# Patient Record
Sex: FEMALE | Race: BLACK OR AFRICAN AMERICAN | NOT HISPANIC OR LATINO | Employment: UNEMPLOYED | URBAN - METROPOLITAN AREA
[De-identification: names, ages, dates, MRNs, and addresses within clinical notes are randomized per-mention and may not be internally consistent; named-entity substitution may affect disease eponyms.]

---
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
## 2023-01-03 ENCOUNTER — PATIENT MESSAGE (OUTPATIENT)
Dept: RESEARCH | Facility: HOSPITAL | Age: 18
End: 2023-01-03
Payer: MEDICAID

## 2023-04-20 ENCOUNTER — HOSPITAL ENCOUNTER (EMERGENCY)
Facility: HOSPITAL | Age: 18
Discharge: HOME | End: 2023-04-20
Attending: INTERNAL MEDICINE
Payer: MEDICAID

## 2023-04-20 VITALS
SYSTOLIC BLOOD PRESSURE: 128 MMHG | RESPIRATION RATE: 18 BRPM | DIASTOLIC BLOOD PRESSURE: 74 MMHG | BODY MASS INDEX: 27.11 KG/M2 | HEART RATE: 72 BPM | OXYGEN SATURATION: 98 % | TEMPERATURE: 98 F | HEIGHT: 63 IN | WEIGHT: 153 LBS

## 2023-04-20 DIAGNOSIS — R10.9 ABDOMINAL PAIN: ICD-10-CM

## 2023-04-20 DIAGNOSIS — K59.00 CONSTIPATION, UNSPECIFIED CONSTIPATION TYPE: Primary | ICD-10-CM

## 2023-04-20 LAB
ALBUMIN SERPL-MCNC: 4 G/DL (ref 3.3–5.5)
ALP SERPL-CCNC: 87 U/L (ref 42–141)
B-HCG UR QL: NEGATIVE
BILIRUB SERPL-MCNC: 0.7 MG/DL (ref 0.2–1.6)
BILIRUBIN, POC UA: NEGATIVE
BLOOD, POC UA: NEGATIVE
BUN SERPL-MCNC: 10 MG/DL (ref 7–22)
CALCIUM SERPL-MCNC: 10.2 MG/DL (ref 8–10.3)
CHLORIDE SERPL-SCNC: 108 MMOL/L (ref 98–108)
CLARITY, POC UA: CLEAR
COLOR, POC UA: YELLOW
CREAT SERPL-MCNC: 1 MG/DL (ref 0.6–1.2)
CTP QC/QA: YES
GLUCOSE SERPL-MCNC: 89 MG/DL (ref 73–118)
GLUCOSE, POC UA: NEGATIVE
KETONES, POC UA: NEGATIVE
LEUKOCYTE EST, POC UA: NEGATIVE
NITRITE, POC UA: NEGATIVE
PH UR STRIP: 7 [PH]
POC ALT (SGPT): 13 U/L (ref 10–47)
POC AST (SGOT): 22 U/L (ref 11–38)
POC TCO2: 28 MMOL/L (ref 18–33)
POTASSIUM BLD-SCNC: 3.7 MMOL/L (ref 3.6–5.1)
PROTEIN, POC UA: NEGATIVE
PROTEIN, POC: 7.3 G/DL (ref 6.4–8.1)
SODIUM BLD-SCNC: 143 MMOL/L (ref 128–145)
SPECIFIC GRAVITY, POC UA: 1.02
UROBILINOGEN, POC UA: 0.2 E.U./DL

## 2023-04-20 PROCEDURE — 99283 EMERGENCY DEPT VISIT LOW MDM: CPT | Mod: ER

## 2023-04-20 PROCEDURE — 81003 URINALYSIS AUTO W/O SCOPE: CPT | Mod: ER

## 2023-04-20 PROCEDURE — 81025 URINE PREGNANCY TEST: CPT | Mod: ER | Performed by: INTERNAL MEDICINE

## 2023-04-20 PROCEDURE — 85025 COMPLETE CBC W/AUTO DIFF WBC: CPT | Mod: ER

## 2023-04-20 PROCEDURE — 80053 COMPREHEN METABOLIC PANEL: CPT | Mod: ER

## 2023-04-20 RX ORDER — SYRING-NEEDL,DISP,INSUL,0.3 ML 29 G X1/2"
296 SYRINGE, EMPTY DISPOSABLE MISCELLANEOUS ONCE
Qty: 1 EACH | Refills: 0 | Status: SHIPPED | OUTPATIENT
Start: 2023-04-20 | End: 2023-04-20

## 2023-04-20 NOTE — ED PROVIDER NOTES
Encounter Date: 4/20/2023       History     Chief Complaint   Patient presents with    Abdominal Pain     Pt presents to ER with c/o periumbilical pain that started earlier yesterday.  Denies any nausea, vomiting, diarrhea or fever.  Boyfriend reports that she told him she has not had a BM in 3 days.       18-year-old female presents to the emergency department complaining abdominal pain since yesterday morning.  She states she is not had a bowel movement in 2-3 days and has had a total of 1 bottle water over the past 24 hours.  She denies fever/chills/nausea/vomiting/chest pain/shortness of breath.    The history is provided by the patient. No  was used.   Review of patient's allergies indicates:  No Known Allergies  Past Medical History:   Diagnosis Date    Anemia      Past Surgical History:   Procedure Laterality Date    TONSILLECTOMY       Family History   Problem Relation Age of Onset    Hypertension Mother     Hypertension Paternal Grandmother     Diabetes Maternal Grandmother     Hypertension Maternal Grandmother     Hypertension Maternal Grandfather      Social History     Tobacco Use    Smoking status: Every Day    Smokeless tobacco: Never   Substance Use Topics    Alcohol use: No    Drug use: Yes     Types: Marijuana     Review of Systems   Constitutional:  Negative for chills and fever.   Respiratory:  Negative for shortness of breath.    Cardiovascular:  Negative for chest pain.   Gastrointestinal:  Positive for abdominal pain and constipation. Negative for anal bleeding, blood in stool, diarrhea, nausea and vomiting.   Musculoskeletal:  Negative for back pain.   All other systems reviewed and are negative.    Physical Exam     Initial Vitals [04/20/23 0238]   BP Pulse Resp Temp SpO2   125/60 (!) 56 18 98.4 °F (36.9 °C) 100 %      MAP       --         Physical Exam    Nursing note and vitals reviewed.  Constitutional: She is not diaphoretic. No distress.   HENT:   Head: Normocephalic  and atraumatic.   Right Ear: External ear normal.   Left Ear: External ear normal.   Eyes: Conjunctivae are normal.   Neck:   Normal range of motion.  Cardiovascular:  Normal rate.           Pulmonary/Chest: Breath sounds normal. No respiratory distress.   Abdominal: Abdomen is soft. Bowel sounds are normal.   No abdominal tenderness to palpation   Musculoskeletal:         General: Normal range of motion.      Cervical back: Normal range of motion.     Neurological: She is alert. She has normal strength.   Skin: Skin is warm and dry. Capillary refill takes less than 2 seconds.   Psychiatric: She has a normal mood and affect. Thought content normal.       ED Course   Procedures  Labs Reviewed   POCT CBC   POCT URINE PREGNANCY   POCT URINALYSIS W/O SCOPE   POCT URINALYSIS W/O SCOPE   POCT CMP   POCT CMP            Imaging Results              X-Ray Abdomen Flat And Erect (Final result)  Result time 04/20/23 03:15:31      Final result by Jarad Oconnell MD (04/20/23 03:15:31)                   Impression:      Nonobstructed bowel-gas pattern.      Electronically signed by: Jarad Oconnell MD  Date:    04/20/2023  Time:    03:15               Narrative:    EXAMINATION:  XR ABDOMEN FLAT AND ERECT    CLINICAL HISTORY:  Unspecified abdominal pain    TECHNIQUE:  Flat and erect AP views of the abdomen were preformed.    COMPARISON:  03/27/2015.    FINDINGS:  There are no calcifications overlying the kidneys.  Bowel gas pattern is non-obstructive.  No evidence for pneumoperitoneum.  Regional osseous structures are similar to prior.                                       Medications - No data to display  Medical Decision Making:   Initial Assessment:   18-year-old female presents to the emergency department complaining abdominal pain since yesterday morning.  She states she is not had a bowel movement in 2-3 days and has had a total of 1 bottle water over the past 24 hours.  She denies fever/chills/nausea/vomiting/chest  pain/shortness of breath.  ED Management:  Urinalysis showed no signs of infection and UPT was negative.  CBC and CMP were ordered as well as abdominal x-ray.  CBC and CMP are reassuring and abdominal x-ray shows no acute findings.  Patient was given instructions for constipation and advised to follow-up with her primary care physician within the next week for re-evaluation/return to the emergency department if condition worsens.  She was encouraged to increase oral water intake and a prescription for magnesium citrate was given prior to discharge.                        Clinical Impression:   Final diagnoses:  [R10.9] Abdominal pain  [K59.00] Constipation, unspecified constipation type (Primary)        ED Disposition Condition    Discharge Stable          ED Prescriptions       Medication Sig Dispense Start Date End Date Auth. Provider    magnesium citrate solution (Expires today) Take 296 mLs by mouth once. for 1 dose 1 each 4/20/2023 4/20/2023 Lavell Greenfield MD          Follow-up Information       Follow up With Specialties Details Why Contact Info    Miracle Kaur MD Pediatrics Schedule an appointment as soon as possible for a visit in 1 week For reevaluation [address] [place] [id number]  [phone]               Lavell Greenfield MD  04/20/23 0326

## 2023-05-12 ENCOUNTER — PATIENT MESSAGE (OUTPATIENT)
Dept: RESEARCH | Facility: HOSPITAL | Age: 18
End: 2023-05-12
Payer: MEDICAID

## 2023-08-01 ENCOUNTER — PATIENT MESSAGE (OUTPATIENT)
Dept: RESEARCH | Facility: HOSPITAL | Age: 18
End: 2023-08-01
Payer: MEDICAID

## 2023-09-21 ENCOUNTER — HOSPITAL ENCOUNTER (EMERGENCY)
Facility: HOSPITAL | Age: 18
Discharge: HOME | End: 2023-09-22
Attending: EMERGENCY MEDICINE
Payer: MEDICAID

## 2023-09-21 DIAGNOSIS — R53.83 FATIGUE, UNSPECIFIED TYPE: Primary | ICD-10-CM

## 2023-09-21 DIAGNOSIS — R11.2 NAUSEA AND VOMITING, UNSPECIFIED VOMITING TYPE: ICD-10-CM

## 2023-09-21 DIAGNOSIS — R06.02 SHORTNESS OF BREATH: ICD-10-CM

## 2023-09-21 LAB
ALBUMIN SERPL-MCNC: 3.9 G/DL (ref 3.3–5.5)
ALP SERPL-CCNC: 96 U/L (ref 42–141)
B-HCG UR QL: NEGATIVE
BILIRUB SERPL-MCNC: 0.5 MG/DL (ref 0.2–1.6)
BILIRUBIN, POC UA: NEGATIVE
BLOOD, POC UA: NEGATIVE
BUN SERPL-MCNC: 12 MG/DL (ref 7–22)
CALCIUM SERPL-MCNC: 9.7 MG/DL (ref 8–10.3)
CHLORIDE SERPL-SCNC: 103 MMOL/L (ref 98–108)
CLARITY, POC UA: CLEAR
COLOR, POC UA: YELLOW
CREAT SERPL-MCNC: 0.8 MG/DL (ref 0.6–1.2)
CTP QC/QA: YES
GLUCOSE SERPL-MCNC: 111 MG/DL (ref 73–118)
GLUCOSE, POC UA: NEGATIVE
HCT, POC: NORMAL
HGB, POC: NORMAL (ref 14–18)
KETONES, POC UA: NEGATIVE
LEUKOCYTE EST, POC UA: NEGATIVE
MCH, POC: NORMAL
MCHC, POC: NORMAL
MCV, POC: NORMAL
MPV, POC: NORMAL
NITRITE, POC UA: NEGATIVE
PH UR STRIP: 7 [PH]
POC ALT (SGPT): 12 U/L (ref 10–47)
POC AST (SGOT): 22 U/L (ref 11–38)
POC PLATELET COUNT: NORMAL
POC TCO2: 30 MMOL/L (ref 18–33)
POTASSIUM BLD-SCNC: 3.7 MMOL/L (ref 3.6–5.1)
PROTEIN, POC UA: NEGATIVE
PROTEIN, POC: 6.9 G/DL (ref 6.4–8.1)
RBC, POC: NORMAL
RDW, POC: NORMAL
SODIUM BLD-SCNC: 139 MMOL/L (ref 128–145)
SPECIFIC GRAVITY, POC UA: >=1.03
UROBILINOGEN, POC UA: 0.2 E.U./DL
WBC, POC: NORMAL

## 2023-09-21 PROCEDURE — 93010 ELECTROCARDIOGRAM REPORT: CPT | Mod: ,,, | Performed by: INTERNAL MEDICINE

## 2023-09-21 PROCEDURE — 81025 URINE PREGNANCY TEST: CPT | Mod: ER | Performed by: EMERGENCY MEDICINE

## 2023-09-21 PROCEDURE — 93010 EKG 12-LEAD: ICD-10-PCS | Mod: ,,, | Performed by: INTERNAL MEDICINE

## 2023-09-21 PROCEDURE — 93005 ELECTROCARDIOGRAM TRACING: CPT | Mod: ER

## 2023-09-21 PROCEDURE — 99284 EMERGENCY DEPT VISIT MOD MDM: CPT | Mod: ER

## 2023-09-22 VITALS
BODY MASS INDEX: 27.82 KG/M2 | DIASTOLIC BLOOD PRESSURE: 68 MMHG | HEIGHT: 63 IN | OXYGEN SATURATION: 100 % | HEART RATE: 54 BPM | TEMPERATURE: 98 F | SYSTOLIC BLOOD PRESSURE: 116 MMHG | RESPIRATION RATE: 16 BRPM | WEIGHT: 157 LBS

## 2023-09-22 RX ORDER — ONDANSETRON 8 MG/1
8 TABLET, ORALLY DISINTEGRATING ORAL EVERY 6 HOURS PRN
Qty: 12 TABLET | Refills: 0 | Status: SHIPPED | OUTPATIENT
Start: 2023-09-22

## 2023-09-22 RX ORDER — POLYETHYLENE GLYCOL 3350 17 G/17G
17 POWDER, FOR SOLUTION ORAL DAILY
Qty: 119 G | Refills: 0 | Status: SHIPPED | OUTPATIENT
Start: 2023-09-22

## 2023-09-22 RX ORDER — LACTULOSE 10 G/15ML
30 SOLUTION ORAL 3 TIMES DAILY
Qty: 405 ML | Refills: 0 | Status: SHIPPED | OUTPATIENT
Start: 2023-09-22 | End: 2023-09-25

## 2023-09-22 NOTE — ED PROVIDER NOTES
"Encounter Date: 9/21/2023    SCRIBE #1 NOTE: I, Judd Wright, am scribing for, and in the presence of,  Raquel Carrillo MD. I have scribed the following portions of the note - Other sections scribed: HPI, ROS, PE.       History     Chief Complaint   Patient presents with    General Illness     Pt reports 1 week of migraines, nausea, SOB, and loss of appetite. Reports irregular menstrual cycles, states she had a negative pregnancy test two days ago. Pt can speak in complete sentences without signs of respiratory distress at this time.      18 year old female with no known medical problems presenting to the Emergency room for further evaluation of nausea, decreased appetite, headaches, and intermittent vomiting that began 1 week ago. Patient reports 3 episodes of non-bloody, non-coffee ground appearing emesis over the last week. She endorses intermittent generalized abdominal discomfort that she describes as a "nauseated" feeling sometimes after eating. Last meal 2 hours PTA. LBM was normal. Denies bright red bleeding from rectum or melena. She notes chronic fatigue, chronic irregular menstrual cycle, and chronic difficulty sleeping. She mentions having a normal sleep study over the summer. She also mentions having nasal congestion 2 nights ago, which resolved after treatment of Nyquil. Denies other treatment for symptoms. Patient rates headache 1/10. Abdominal discomfort and nausea currently resolved. LMP 1 month ago. Denies contraceptive use. No other exacerbating or alleviating factors. NKDA. Denies urinary symptoms, diarrhea, constipation, cough, shortness of breath, chest pain, leg swelling palpitations, dizziness, syncope, or others. No further complaints at present time.       The history is provided by the patient. No  was used.     Review of patient's allergies indicates:  No Known Allergies  Past Medical History:   Diagnosis Date    Anemia      Past Surgical History:   Procedure " Laterality Date    TONSILLECTOMY       Family History   Problem Relation Age of Onset    Hypertension Mother     Hypertension Paternal Grandmother     Diabetes Maternal Grandmother     Hypertension Maternal Grandmother     Hypertension Maternal Grandfather      Social History     Tobacco Use    Smoking status: Every Day    Smokeless tobacco: Never   Substance Use Topics    Alcohol use: No    Drug use: Yes     Types: Marijuana     Review of Systems   Constitutional:  Positive for appetite change. Negative for fever.   HENT:  Positive for congestion (Resolved).    Respiratory:  Negative for cough and shortness of breath.    Cardiovascular:  Negative for chest pain and palpitations.   Gastrointestinal:  Positive for nausea (Resolved) and vomiting. Negative for anal bleeding, blood in stool, constipation and diarrhea.        + Abdominal discomfort (Presently resolved)     Genitourinary:  Negative for decreased urine volume, difficulty urinating, dysuria and frequency.   Neurological:  Positive for headaches. Negative for dizziness and syncope.   All other systems reviewed and are negative.      Physical Exam     Initial Vitals [09/21/23 2215]   BP Pulse Resp Temp SpO2   128/76 (!) 57 18 97.9 °F (36.6 °C) 100 %      MAP       --         Physical Exam    Nursing note and vitals reviewed.  Constitutional: She appears well-developed and well-nourished.   HENT:   Head: Normocephalic and atraumatic.   Right Ear: External ear normal.   Left Ear: External ear normal.   Eyes: Conjunctivae are normal.   Neck: Phonation normal. Neck supple.   Normal range of motion.  Cardiovascular:  Regular rhythm and intact distal pulses.   Bradycardia present.         Pulmonary/Chest: Effort normal. No accessory muscle usage or stridor. No tachypnea. No respiratory distress.   Abdominal: Abdomen is soft. There is no abdominal tenderness.   Musculoskeletal:         General: No tenderness or edema.      Cervical back: Normal range of motion and  neck supple.     Neurological: She is alert and oriented to person, place, and time.   Skin: Skin is warm and dry. No rash noted.   Psychiatric: She has a normal mood and affect. Her behavior is normal.         ED Course   Procedures  Labs Reviewed   POCT URINALYSIS W/O SCOPE - Abnormal; Notable for the following components:       Result Value    Spec Grav UA >=1.030 (*)     All other components within normal limits   POCT URINE PREGNANCY   POCT URINALYSIS W/O SCOPE   POCT CBC   POCT CMP   POCT CMP          Imaging Results    None          Medications - No data to display  Medical Decision Making  Amount and/or Complexity of Data Reviewed  External Data Reviewed: labs and ECG.  Labs: ordered.  ECG/medicine tests: ordered.    Risk  OTC drugs.  Prescription drug management.    Labs Reviewed        Admission on 09/21/2023   Component Date Value Ref Range Status    POC Preg Test, Ur 09/21/2023 Negative  Negative Final     Acceptable 09/21/2023 Yes   Final    Glucose, UA 09/21/2023 Negative   Final    Bilirubin, UA 09/21/2023 Negative   Final    Ketones, UA 09/21/2023 Negative   Final    Spec Grav UA 09/21/2023 >=1.030 (>)   Final    Blood, UA 09/21/2023 Negative   Final    PH, UA 09/21/2023 7.0   Final    Protein, UA 09/21/2023 Negative   Final    Urobilinogen, UA 09/21/2023 0.2  E.U./dL Final    Nitrite, UA 09/21/2023 Negative   Final    Leukocytes, UA 09/21/2023 Negative   Final    Color, UA 09/21/2023 Yellow   Final    Clarity, UA 09/21/2023 Clear   Final    Albumin, POC 09/21/2023 3.9  3.3 - 5.5 g/dL Final    Alkaline Phosphatase, POC 09/21/2023 96  42 - 141 U/L Final    ALT (SGPT), POC 09/21/2023 12  10 - 47 U/L Final    AST (SGOT), POC 09/21/2023 22  11 - 38 U/L Final    POC BUN 09/21/2023 12  7 - 22 mg/dL Final    Calcium, POC 09/21/2023 9.7  8.0 - 10.3 mg/dL Final    POC Chloride 09/21/2023 103  98 - 108 mmol/L Final    POC Creatinine 09/21/2023 0.8  0.6 - 1.2 mg/dL Final    POC Glucose 09/21/2023  111  73 - 118 mg/dL Final    POC Potassium 09/21/2023 3.7  3.6 - 5.1 mmol/L Final    POC Sodium 09/21/2023 139  128 - 145 mmol/L Final    Bilirubin, POC 09/21/2023 0.5  0.2 - 1.6 mg/dL Final    POC TCO2 09/21/2023 30  18 - 33 mmol/L Final    Protein, POC 09/21/2023 6.9  6.4 - 8.1 g/dL Final        Imaging Reviewed    Imaging Results    None         Medications given in ED    Medications - No data to display      Note was created using voice recognition software. Note may have occasional typographical errors that may not have been identified and edited despite good jeff initial review prior to signing.    ***          Scribe Attestation:   Scribe #1: I performed the above scribed service and the documentation accurately describes the services I performed. I attest to the accuracy of the note.                        Clinical Impression:   Final diagnoses:  [R06.02] Shortness of breath  [R53.83] Fatigue, unspecified type (Primary)  [R11.2] Nausea and vomiting, unspecified vomiting type        ED Disposition Condition    Discharge Stable          ED Prescriptions       Medication Sig Dispense Start Date End Date Auth. Provider    ondansetron (ZOFRAN-ODT) 8 MG TbDL Take 1 tablet (8 mg total) by mouth every 6 (six) hours as needed. 12 tablet 9/22/2023 -- Raquel Carrillo MD    lactulose (CHRONULAC) 20 gram/30 mL Soln Take 45 mLs (30 g total) by mouth 3 (three) times daily. (Alternative dosing 45 ml every hour until two large BMs achieved in a single day) for 3 days 405 mL 9/22/2023 9/25/2023 Raquel Carrillo MD    polyethylene glycol (GLYCOLAX) 17 gram/dose powder Take 17 g by mouth once daily. 119 g 9/22/2023 -- Raquel Carrillo MD          Follow-up Information       Follow up With Specialties Details Why Contact Info    Miracle Kaur MD Pediatrics Call today to schedule an appointment, for re-evaluation of today's complaint, and ongoing care [address] [place] [address] [id number] [phone]      The nearest  emergency department.  Go to  As needed, If symptoms worsen     [facility] Cardiology Call in 1 day to schedule an appointment, for re-evaluation of today's complaint, and ongoing care [address] [facility] [id number] [phone]

## 2023-09-22 NOTE — DISCHARGE INSTRUCTIONS
Drink plenty of electrolyte-rich fluids (at least one gallon or more daily).  Limit/avoid caffeine (such as coffee, tea, Coke, Coke Zero, Pepsi, Root Beer, Dr .Pepper, Mountain Dew, energy drinks, pre-workout supplements, and many others.) alcohol and dairy intake while symptoms persist.

## 2023-09-22 NOTE — ED PROVIDER NOTES
"Encounter Date: 9/21/2023    SCRIBE #1 NOTE: I, Judd Wright, am scribing for, and in the presence of,  Raquel Carrillo MD. I have scribed the following portions of the note - Other sections scribed: HPI.       History     Chief Complaint   Patient presents with    General Illness     Pt reports 1 week of migraines, nausea, SOB, and loss of appetite. Reports irregular menstrual cycles, states she had a negative pregnancy test two days ago. Pt can speak in complete sentences without signs of respiratory distress at this time.      18 year old female with no known medical problems presenting to the Emergency room for further evaluation of nausea, decreased appetite, headaches, and intermittent vomiting that began 1 week ago. Patient reports 3 episodes of non-bloody, non-coffee ground appearing emesis over the last week. She endorses intermittent generalized abdominal discomfort that she describes as a "nauseated" feeling sometimes after eating. Last meal 2 hours PTA. LBM was normal. Denies bright red bleeding from rectum or melena. She notes chronic fatigue, chronic irregular menstrual cycle, and chronic difficulty sleeping. She mentions having a normal sleep study over the summer. She also mentions having nasal congestion 2 nights ago, which resolved after treatment of Nyquil. Denies other treatment for symptoms. Patient rates headache 1/10. Abdominal discomfort and nausea currently resolved. LMP 1 month ago. Denies contraceptive use. No other exacerbating or alleviating factors. NKDA. Denies urinary symptoms, diarrhea, constipation, cough, shortness of breath, chest pain, leg swelling palpitations, dizziness, syncope, or others. No further complaints at present time.       The history is provided by the patient.     Review of patient's allergies indicates:  No Known Allergies  Past Medical History:   Diagnosis Date    Anemia      Past Surgical History:   Procedure Laterality Date    TONSILLECTOMY       Family " History   Problem Relation Age of Onset    Hypertension Mother     Hypertension Paternal Grandmother     Diabetes Maternal Grandmother     Hypertension Maternal Grandmother     Hypertension Maternal Grandfather      Social History     Tobacco Use    Smoking status: Every Day    Smokeless tobacco: Never   Substance Use Topics    Alcohol use: No    Drug use: Yes     Types: Marijuana     Review of Systems   Constitutional:  Positive for fatigue. Negative for fever.   Respiratory:  Negative for cough and shortness of breath.    Cardiovascular:  Negative for chest pain, palpitations and leg swelling.   Gastrointestinal:  Positive for abdominal pain, nausea and vomiting. Negative for constipation and diarrhea.   Genitourinary:  Positive for menstrual problem (irregular).   Neurological:  Positive for headaches (mild, intermittent).   Psychiatric/Behavioral:  Positive for sleep disturbance (chronic).        Physical Exam     Initial Vitals [09/21/23 2215]   BP Pulse Resp Temp SpO2   128/76 (!) 57 18 97.9 °F (36.6 °C) 100 %      MAP       --         Physical Exam    Nursing note and vitals reviewed.  Constitutional: She appears well-developed and well-nourished. She is not diaphoretic. No distress.   HENT:   Head: Normocephalic and atraumatic.   Eyes: Conjunctivae are normal. Pupils are equal, round, and reactive to light.   Neck: Neck supple.   Normal range of motion.  Cardiovascular:  Regular rhythm and intact distal pulses.   Bradycardia present.         Pulmonary/Chest: No accessory muscle usage. No tachypnea. No respiratory distress.   Abdominal: Abdomen is soft and protuberant. Bowel sounds are normal. She exhibits no distension. There is no abdominal tenderness. There is no rebound and no guarding.   Musculoskeletal:         General: No tenderness. Normal range of motion.      Cervical back: Normal range of motion and neck supple.     Neurological: She is alert and oriented to person, place, and time. She has normal  strength. Gait normal. GCS eye subscore is 4. GCS verbal subscore is 5. GCS motor subscore is 6.   Skin: Skin is warm. Capillary refill takes less than 2 seconds.   Psychiatric: She has a normal mood and affect.         ED Course   Procedures  Labs Reviewed   POCT URINALYSIS W/O SCOPE - Abnormal; Notable for the following components:       Result Value    Spec Grav UA >=1.030 (*)     All other components within normal limits   POCT URINE PREGNANCY   POCT URINALYSIS W/O SCOPE   POCT CBC   POCT CMP   POCT CMP     EKG Readings: (Independently Interpreted)   Initial Reading: No STEMI. Rhythm: Sinus Bradycardia. Heart Rate: 50. Ectopy: No Ectopy. Conduction: Normal. ST Segments: Normal ST Segments. T Waves Flipped: III. Clinical Impression: Normal Sinus Rhythm     ECG Results              EKG 12-lead (Final result)  Result time 09/23/23 18:43:56      Final result by Interface, Lab In [facility] (09/23/23 18:43:56)                   Narrative:    Test Reason : R06.02,    Vent. Rate : 050 BPM     Atrial Rate : 050 BPM     P-R Int : 120 ms          QRS Dur : 074 ms      QT Int : 436 ms       P-R-T Axes : 016 033 020 degrees     QTc Int : 397 ms    Sinus bradycardia with sinus arrhythmia  Cannot rule out Anterior infarct ,age undetermined  Abnormal ECG  When compared with ECG of 23-AUG-2022 03:06,  Questionable change in The axis  Nonspecific T wave abnormality has replaced inverted T waves in Lateral  leads  Confirmed by Nelson Quach MD ([id number]) on 9/23/2023 6:43:46 PM    Referred By: [email or web address]   SELF           Confirmed By:Nelson Quach MD                                  Imaging Results    None          Medications - No data to display  Medical Decision Making  Amount and/or Complexity of Data Reviewed  Labs: ordered.    Risk  OTC drugs.  Prescription drug management.                          Medical Decision Making:   Differential Diagnosis:   gastroenteritis (viral vs bacterial vs parasitic vs toxin exposure),  diverticulitis, dehydration, electrolyte abnormality, toxicologic exposure, thyroid storm, GIB, hypovolemia, DKA, pancreatitis, IBS, metabolic disturbance, antibiotic/drug induced, and others.     Clinical Tests:   Lab Tests: Ordered and Reviewed  Labs Reviewed      Admission on 09/21/2023, Discharged on 09/22/2023   Component Date Value Ref Range Status    POC Preg Test, Ur 09/21/2023 Negative  Negative Final     Acceptable 09/21/2023 Yes   Final    Glucose, UA 09/21/2023 Negative   Final    Bilirubin, UA 09/21/2023 Negative   Final    Ketones, UA 09/21/2023 Negative   Final    Spec Grav UA 09/21/2023 >=1.030 (>)   Final    Blood, UA 09/21/2023 Negative   Final    PH, UA 09/21/2023 7.0   Final    Protein, UA 09/21/2023 Negative   Final    Urobilinogen, UA 09/21/2023 0.2  E.U./dL Final    Nitrite, UA 09/21/2023 Negative   Final    Leukocytes, UA 09/21/2023 Negative   Final    Color, UA 09/21/2023 Yellow   Final    Clarity, UA 09/21/2023 Clear   Final    Albumin, POC 09/21/2023 3.9  3.3 - 5.5 g/dL Final    Alkaline Phosphatase, POC 09/21/2023 96  42 - 141 U/L Final    ALT (SGPT), POC 09/21/2023 12  10 - 47 U/L Final    AST (SGOT), POC 09/21/2023 22  11 - 38 U/L Final    POC BUN 09/21/2023 12  7 - 22 mg/dL Final    Calcium, POC 09/21/2023 9.7  8.0 - 10.3 mg/dL Final    POC Chloride 09/21/2023 103  98 - 108 mmol/L Final    POC Creatinine 09/21/2023 0.8  0.6 - 1.2 mg/dL Final    POC Glucose 09/21/2023 111  73 - 118 mg/dL Final    POC Potassium 09/21/2023 3.7  3.6 - 5.1 mmol/L Final    POC Sodium 09/21/2023 139  128 - 145 mmol/L Final    Bilirubin, POC 09/21/2023 0.5  0.2 - 1.6 mg/dL Final    POC TCO2 09/21/2023 30  18 - 33 mmol/L Final    Protein, POC 09/21/2023 6.9  6.4 - 8.1 g/dL Final        Imaging Reviewed    Imaging Results    None         Medications given in ED    Medications - No data to display    Note was created using voice recognition software. Note may have occasional typographical errors  that may not have been identified and edited despite good jeff initial review prior to signing.      Clinical Impression:   Final diagnoses:  [R06.02] Shortness of breath  [R53.83] Fatigue, unspecified type (Primary)  [R11.2] Nausea and vomiting, unspecified vomiting type        ED Disposition Condition    Discharge Stable          ED Prescriptions       Medication Sig Dispense Start Date End Date Auth. Provider    ondansetron (ZOFRAN-ODT) 8 MG TbDL Take 1 tablet (8 mg total) by mouth every 6 (six) hours as needed. 12 tablet 2023 -- Raquel Carrillo MD    lactulose (CHRONULAC) 20 gram/30 mL Soln () Take 45 mLs (30 g total) by mouth 3 (three) times daily. (Alternative dosing 45 ml every hour until two large BMs achieved in a single day) for 3 days 405 mL 2023 Raquel Carrillo MD    polyethylene glycol (GLYCOLAX) 17 gram/dose powder Take 17 g by mouth once daily. 119 g 2023 -- Raquel Carrillo MD          Follow-up Information       Follow up With Specialties Details Why Contact Info    Miracle Kaur MD Pediatrics Call today to schedule an appointment, for re-evaluation of today's complaint, and ongoing care [address] [place] [id number] [phone]      The nearest emergency department.  Go to  As needed, If symptoms worsen     [facility] Call in 1 day to schedule an appointment, for re-evaluation of today's complaint, and ongoing care [address] [facility] [place] [id number] [phone]             Raquel Carrillo MD  10/22/23 0005

## 2024-03-26 PROCEDURE — 99285 EMERGENCY DEPT VISIT HI MDM: CPT | Mod: 25,ER

## 2024-03-26 PROCEDURE — 81025 URINE PREGNANCY TEST: CPT | Mod: ER

## 2024-03-27 ENCOUNTER — HOSPITAL ENCOUNTER (EMERGENCY)
Facility: HOSPITAL | Age: 19
Discharge: HOME | End: 2024-03-27
Attending: EMERGENCY MEDICINE
Payer: MEDICAID

## 2024-03-27 VITALS
HEIGHT: 63 IN | WEIGHT: 152 LBS | BODY MASS INDEX: 26.93 KG/M2 | TEMPERATURE: 98 F | RESPIRATION RATE: 20 BRPM | OXYGEN SATURATION: 99 % | SYSTOLIC BLOOD PRESSURE: 121 MMHG | HEART RATE: 65 BPM | DIASTOLIC BLOOD PRESSURE: 57 MMHG

## 2024-03-27 DIAGNOSIS — R10.13 EPIGASTRIC ABDOMINAL PAIN: Primary | ICD-10-CM

## 2024-03-27 LAB
ALBUMIN SERPL-MCNC: 3.7 G/DL (ref 3.3–5.5)
ALBUMIN SERPL-MCNC: 3.8 G/DL (ref 3.3–5.5)
ALP SERPL-CCNC: 83 U/L (ref 42–141)
ALP SERPL-CCNC: 88 U/L (ref 42–141)
B-HCG UR QL: NEGATIVE
BILIRUB SERPL-MCNC: 0.5 MG/DL (ref 0.2–1.6)
BILIRUB SERPL-MCNC: 0.6 MG/DL (ref 0.2–1.6)
BILIRUBIN, POC UA: NEGATIVE
BLOOD, POC UA: NEGATIVE
BUN SERPL-MCNC: 12 MG/DL (ref 7–22)
CALCIUM SERPL-MCNC: 9.7 MG/DL (ref 8–10.3)
CHLORIDE SERPL-SCNC: 107 MMOL/L (ref 98–108)
CLARITY, POC UA: CLEAR
COLOR, POC UA: ABNORMAL
CREAT SERPL-MCNC: 1.3 MG/DL (ref 0.6–1.2)
CTP QC/QA: YES
GLUCOSE SERPL-MCNC: 76 MG/DL (ref 73–118)
GLUCOSE, POC UA: NEGATIVE
HCT, POC: NORMAL
HGB, POC: NORMAL (ref 14–18)
KETONES, POC UA: ABNORMAL
LEUKOCYTE EST, POC UA: NEGATIVE
MCH, POC: NORMAL
MCHC, POC: NORMAL
MCV, POC: NORMAL
MPV, POC: NORMAL
NITRITE, POC UA: NEGATIVE
PH UR STRIP: 7 [PH]
POC ALT (SGPT): 16 U/L (ref 10–47)
POC ALT (SGPT): 19 U/L (ref 10–47)
POC AMYLASE: 101 U/L (ref 14–97)
POC AST (SGOT): 20 U/L (ref 11–38)
POC AST (SGOT): 21 U/L (ref 11–38)
POC GGT: 21 U/L (ref 5–65)
POC PLATELET COUNT: NORMAL
POC TCO2: 29 MMOL/L (ref 18–33)
POTASSIUM BLD-SCNC: 4.1 MMOL/L (ref 3.6–5.1)
PROTEIN, POC UA: NEGATIVE
PROTEIN, POC: 7.1 G/DL (ref 6.4–8.1)
PROTEIN, POC: 7.2 G/DL (ref 6.4–8.1)
RBC, POC: NORMAL
RDW, POC: NORMAL
SODIUM BLD-SCNC: 146 MMOL/L (ref 128–145)
SPECIFIC GRAVITY, POC UA: 1.02
UROBILINOGEN, POC UA: 0.2 E.U./DL
WBC, POC: NORMAL

## 2024-03-27 PROCEDURE — 25500020 PHARM REV CODE 255: Mod: ER | Performed by: EMERGENCY MEDICINE

## 2024-03-27 PROCEDURE — 82040 ASSAY OF SERUM ALBUMIN: CPT | Mod: 59,ER

## 2024-03-27 PROCEDURE — 82150 ASSAY OF AMYLASE: CPT | Mod: ER

## 2024-03-27 PROCEDURE — 25000003 PHARM REV CODE 250: Mod: ER | Performed by: EMERGENCY MEDICINE

## 2024-03-27 PROCEDURE — 81025 URINE PREGNANCY TEST: CPT | Mod: ER

## 2024-03-27 PROCEDURE — 85025 COMPLETE CBC W/AUTO DIFF WBC: CPT | Mod: ER

## 2024-03-27 PROCEDURE — 80053 COMPREHEN METABOLIC PANEL: CPT | Mod: ER

## 2024-03-27 PROCEDURE — 96360 HYDRATION IV INFUSION INIT: CPT | Mod: 59,ER

## 2024-03-27 RX ORDER — SYRING-NEEDL,DISP,INSUL,0.3 ML 29 G X1/2"
296 SYRINGE, EMPTY DISPOSABLE MISCELLANEOUS ONCE
Qty: 296 ML | Refills: 0 | Status: SHIPPED | OUTPATIENT
Start: 2024-03-27 | End: 2024-03-27

## 2024-03-27 RX ORDER — FAMOTIDINE 20 MG/1
20 TABLET, FILM COATED ORAL 2 TIMES DAILY
Qty: 28 TABLET | Refills: 0 | Status: SHIPPED | OUTPATIENT
Start: 2024-03-27 | End: 2024-04-10

## 2024-03-27 RX ORDER — PANTOPRAZOLE SODIUM 20 MG/1
20 TABLET, DELAYED RELEASE ORAL DAILY
Qty: 30 TABLET | Refills: 0 | Status: SHIPPED | OUTPATIENT
Start: 2024-03-27 | End: 2024-04-26

## 2024-03-27 RX ORDER — ONDANSETRON 8 MG/1
8 TABLET, ORALLY DISINTEGRATING ORAL EVERY 6 HOURS PRN
Qty: 45 TABLET | Refills: 0 | Status: SHIPPED | OUTPATIENT
Start: 2024-03-27

## 2024-03-27 RX ADMIN — IOHEXOL 75 ML: 350 INJECTION, SOLUTION INTRAVENOUS at 02:03

## 2024-03-27 RX ADMIN — SODIUM CHLORIDE 1000 ML: 9 INJECTION, SOLUTION INTRAVENOUS at 02:03

## 2024-03-27 NOTE — ED PROVIDER NOTES
"Encounter Date: 3/26/2024       History     Chief Complaint   Patient presents with    Abdominal Pain     Generalized abdominal pain with N/V onset "for awhile". Seen here for same s/s before, states this time it is not as bad.     19 y.o. female emergency department complaining of acute, epigastric abdominal pain nausea and urge to vomit but unable to do so that occurred around 11:00 p.m. tonight.  She reports multiple similar recurrences over last couple of months.  Diagnosis unknown.  She reports pain is usually alleviated by taking Zofran but indicates that she did not attempt any treatment prior to arrival.   She reports resolution of today's symptoms within 10 minutes of onset.  She mentions having chronic constipation for which she takes MiraLax daily but states she missed taking the MiraLax over the last couple of days.  She mentions sneezing and runny nose over the last two days but denies fever, sore throat, cough, congestion, fever or malaise.  She further denies dysuria, frequency, hematuria, vaginal discharge, diarrhea, bright red blood per rectum, melena, shortness of breath or chest pain.     The history is provided by the patient.     Review of patient's allergies indicates:  No Known Allergies  Past Medical History:   Diagnosis Date    Anemia      Past Surgical History:   Procedure Laterality Date    TONSILLECTOMY       Family History   Problem Relation Age of Onset    Hypertension Mother     Hypertension Paternal Grandmother     Diabetes Maternal Grandmother     Hypertension Maternal Grandmother     Hypertension Maternal Grandfather      Social History     Tobacco Use    Smoking status: Every Day    Smokeless tobacco: Never   Substance Use Topics    Alcohol use: No    Drug use: Yes     Types: Marijuana     Review of Systems   Constitutional:  Negative for appetite change and fever.   HENT:  Positive for rhinorrhea and sneezing. Negative for congestion, sore throat, trouble swallowing and voice " change.    Respiratory:  Negative for cough and shortness of breath.    Cardiovascular:  Negative for chest pain.   Gastrointestinal:  Positive for abdominal pain, constipation (chronic, intermittent - controlled with Miralax - missed dose over the last few days) and nausea. Negative for diarrhea and vomiting.   Genitourinary:  Negative for difficulty urinating, dysuria, frequency, hematuria, urgency and vaginal discharge.       Physical Exam     Initial Vitals [03/27/24 0002]   BP Pulse Resp Temp SpO2   123/76 (!) 58 18 97.9 °F (36.6 °C) 100 %      MAP       --         Physical Exam    Nursing note and vitals reviewed.  Constitutional: She appears well-developed and well-nourished. She is not diaphoretic. No distress.   HENT:   Head: Normocephalic and atraumatic.   Eyes: Conjunctivae are normal. Pupils are equal, round, and reactive to light.   Neck: Neck supple.   Normal range of motion.  Cardiovascular:  Normal rate and intact distal pulses.           Pulmonary/Chest: No accessory muscle usage. No tachypnea. No respiratory distress.   Abdominal: Abdomen is soft. She exhibits no distension. There is abdominal tenderness in the epigastric area.   No right CVA tenderness.  No left CVA tenderness. There is no rebound and no guarding.   Musculoskeletal:         General: No tenderness. Normal range of motion.      Cervical back: Normal range of motion and neck supple.     Neurological: She is alert and oriented to person, place, and time. She has normal strength. Gait normal. GCS eye subscore is 4. GCS verbal subscore is 5. GCS motor subscore is 6.   Skin: Skin is warm. Capillary refill takes less than 2 seconds.   Psychiatric: She has a normal mood and affect.         ED Course   Procedures  Labs Reviewed   POCT URINALYSIS W/O SCOPE - Abnormal; Notable for the following components:       Result Value    Ketones, UA Trace (*)     All other components within normal limits   POCT LIVER PANEL - Abnormal; Notable for the  following components:    Amylase,  (*)     All other components within normal limits   POCT CMP - Abnormal; Notable for the following components:    POC Creatinine 1.3 (*)     POC Sodium 146 (*)     All other components within normal limits   POCT URINE PREGNANCY   POCT CBC   POCT CMP   POCT LIVER PANEL          Imaging Results              CT Abdomen Pelvis With IV Contrast NO Oral Contrast (Final result)  Result time 03/27/24 03:26:05      Final result by Julienne Padilla MD (03/27/24 03:26:05)                   Impression:      No acute intra-abdominal abnormality identified on current study.      Electronically signed by: Julienne Padilla MD  Date:    03/27/2024  Time:    03:26               Narrative:    EXAMINATION:  CT ABDOMEN PELVIS WITH IV CONTRAST    CLINICAL HISTORY:  Epigastric pain;    TECHNIQUE:  Low dose axial images, sagittal and coronal reformations were obtained from the lung bases to the pubic symphysis following the IV administration of 75 mL of Omnipaque 350 .  No oral contrast.    COMPARISON:  CT abdomen and pelvis 08/20/2022    FINDINGS:  The visualized lung bases are free of pleural fluid or focal consolidation.  The visualized portions of the heart and pericardium are within limits.    The liver demonstrates no focal abnormality.  The gallbladder is contracted without definite evidence of large calcified stone in its lumen.  The portal vein and splenic vein appear patent.  The pancreas appears to enhance appropriately without significant peripancreatic inflammatory change/fat stranding or fluid.  The spleen and adrenal glands are unremarkable.  No significant intra or extrahepatic biliary ductal dilatation.    The kidneys are normal in size and location and enhance symmetrically.  No hydronephrosis bilaterally.  The urinary bladder demonstrates slight wall thickening.  Correlation with urinalysis for cystitis/infectious process advised.  The uterus and adnexal regions are within normal  limits.    The abdominal aorta is nonaneurysmal.  Shotty periaortic lymph nodes present.    The visualized loops of large and small bowel demonstrate no evidence of obstruction or inflammatory change.  The appendix is visualized and unremarkable noting air within its lumen and no significant adjacent inflammatory change.  There is no free intraperitoneal air, portal venous gas or ascites.    The visualized osseous structures are intact.  Extraperitoneal soft tissues are within normal limits.                                       Medications   sodium chloride 0.9% bolus 1,000 mL 1,000 mL (0 mLs Intravenous Stopped 3/27/24 0341)   iohexoL (OMNIPAQUE 350) injection 75 mL (75 mLs Intravenous Given 3/27/24 0243)     Medical Decision Making  Presentation consistent with acute epigastric abdominal pain likely secondary to gastritis/GERD, no recurrent symptoms throughout ED course.  Plan to send patient home with PPI/H2 blocker and PCP/GI follow up. Abdominal exam without peritoneal signs. No evidence of acute abdomen at this time. Well appearing. Given work up have low suspicion for acute hepatobiliary disease (including acute cholecystitis or cholangitis), upper GI bleed, acute pancreatitis, gastric perforation, acute infectious processes (pneumonia, hepatitis, pyelonephritis), atypical appendicitis, vascular catastrophe, bowel obstruction or viscus perforation, or acute coronary syndrome. Presentation not consistent with other acute, emergent causes of abdominal pain at this time.    Amount and/or Complexity of Data Reviewed  Radiology: ordered.    Risk  OTC drugs.  Prescription drug management.                   Labs Reviewed  Pertinent lab and imaging findings include:  There is no leukocytosis, H&H within normal limits, Na 146, creatinine 1.3 otherwise CMP unremarkable, amylase 101, UA with trace ketones otherwise negative, CTAP negative for acute abnormality    Admission on 03/27/2024, Discharged on 03/27/2024    Component Date Value Ref Range Status    POC Preg Test, Ur 03/27/2024 Negative  Negative Final     Acceptable 03/27/2024 Yes   Final    Glucose, UA 03/27/2024 Negative   Final    Bilirubin, UA 03/27/2024 Negative   Final    Ketones, UA 03/27/2024 Trace (A)   Final    Spec Grav UA 03/27/2024 1.025   Final    Blood, UA 03/27/2024 Negative   Final    PH, UA 03/27/2024 7.0   Final    Protein, UA 03/27/2024 Negative   Final    Urobilinogen, UA 03/27/2024 0.2  E.U./dL Final    Nitrite, UA 03/27/2024 Negative   Final    Leukocytes, UA 03/27/2024 Negative   Final    Color, UA 03/27/2024 Dark yellow   Final    Clarity, UA 03/27/2024 Clear   Final    Albumin, POC 03/27/2024 3.8  3.3 - 5.5 g/dL Final    Alkaline Phosphatase, POC 03/27/2024 83  42 - 141 U/L Final    ALT (SGPT), POC 03/27/2024 19  10 - 47 U/L Final    Amylase, POC 03/27/2024 101 (H)  14 - 97 U/L Final    AST (SGOT), POC 03/27/2024 20  11 - 38 U/L Final    POC GGT 03/27/2024 21  5 - 65 U/L Final    Bilirubin, POC 03/27/2024 0.6  0.2 - 1.6 mg/dL Final    Protein, POC 03/27/2024 7.1  6.4 - 8.1 g/dL Final    Albumin, POC 03/27/2024 3.7  3.3 - 5.5 g/dL Final    Alkaline Phosphatase, POC 03/27/2024 88  42 - 141 U/L Final    ALT (SGPT), POC 03/27/2024 16  10 - 47 U/L Final    AST (SGOT), POC 03/27/2024 21  11 - 38 U/L Final    POC BUN 03/27/2024 12  7 - 22 mg/dL Final    Calcium, POC 03/27/2024 9.7  8.0 - 10.3 mg/dL Final    POC Chloride 03/27/2024 107  98 - 108 mmol/L Final    POC Creatinine 03/27/2024 1.3 (H)  0.6 - 1.2 mg/dL Final    POC Glucose 03/27/2024 76  73 - 118 mg/dL Final    POC Potassium 03/27/2024 4.1  3.6 - 5.1 mmol/L Final    POC Sodium 03/27/2024 146 (H)  128 - 145 mmol/L Final    Bilirubin, POC 03/27/2024 0.5  0.2 - 1.6 mg/dL Final    POC TCO2 03/27/2024 29  18 - 33 mmol/L Final    Protein, POC 03/27/2024 7.2  6.4 - 8.1 g/dL Final        Imaging Reviewed    Imaging Results              CT Abdomen Pelvis With IV Contrast NO Oral  Contrast (Final result)  Result time 03/27/24 03:26:05      Final result by Julienne Padilla MD (03/27/24 03:26:05)                   Impression:      No acute intra-abdominal abnormality identified on current study.      Electronically signed by: Julienne Padilla MD  Date:    03/27/2024  Time:    03:26               Narrative:    EXAMINATION:  CT ABDOMEN PELVIS WITH IV CONTRAST    CLINICAL HISTORY:  Epigastric pain;    TECHNIQUE:  Low dose axial images, sagittal and coronal reformations were obtained from the lung bases to the pubic symphysis following the IV administration of 75 mL of Omnipaque 350 .  No oral contrast.    COMPARISON:  CT abdomen and pelvis 08/20/2022    FINDINGS:  The visualized lung bases are free of pleural fluid or focal consolidation.  The visualized portions of the heart and pericardium are within limits.    The liver demonstrates no focal abnormality.  The gallbladder is contracted without definite evidence of large calcified stone in its lumen.  The portal vein and splenic vein appear patent.  The pancreas appears to enhance appropriately without significant peripancreatic inflammatory change/fat stranding or fluid.  The spleen and adrenal glands are unremarkable.  No significant intra or extrahepatic biliary ductal dilatation.    The kidneys are normal in size and location and enhance symmetrically.  No hydronephrosis bilaterally.  The urinary bladder demonstrates slight wall thickening.  Correlation with urinalysis for cystitis/infectious process advised.  The uterus and adnexal regions are within normal limits.    The abdominal aorta is nonaneurysmal.  Shotty periaortic lymph nodes present.    The visualized loops of large and small bowel demonstrate no evidence of obstruction or inflammatory change.  The appendix is visualized and unremarkable noting air within its lumen and no significant adjacent inflammatory change.  There is no free intraperitoneal air, portal venous gas or  ascites.    The visualized osseous structures are intact.  Extraperitoneal soft tissues are within normal limits.                                      Medications given in ED    Medications   sodium chloride 0.9% bolus 1,000 mL 1,000 mL (0 mLs Intravenous Stopped 3/27/24 0341)   iohexoL (OMNIPAQUE 350) injection 75 mL (75 mLs Intravenous Given 3/27/24 0243)       Note was created using voice recognition software. Note may have occasional typographical errors that may not have been identified and edited despite good jeff initial review prior to signing.         Medical Decision Making:   Differential Diagnosis:       Clinical Tests:   Lab Tests: Ordered and Reviewed  Radiological Study: Ordered and Reviewed             Clinical Impression:  Final diagnoses:  [R10.13] Epigastric abdominal pain (Primary)          ED Disposition Condition    Discharge Stable          ED Prescriptions       Medication Sig Dispense Start Date End Date Auth. Provider    magnesium citrate solution () Take 296 mLs by mouth once. for 1 dose 296 mL 3/27/2024 3/27/2024 Raquel Carrillo MD    famotidine (PEPCID) 20 MG tablet Take 1 tablet (20 mg total) by mouth 2 (two) times daily. for 14 days 28 tablet 3/27/2024 4/10/2024 Raquel Carrillo MD    pantoprazole (PROTONIX) 20 MG tablet Take 1 tablet (20 mg total) by mouth once daily. 30 tablet 3/27/2024 2024 Raquel Carrillo MD    ondansetron (ZOFRAN-ODT) 8 MG TbDL Take 1 tablet (8 mg total) by mouth every 6 (six) hours as needed (nausea). 45 tablet 3/27/2024 -- Raquel Carrillo MD          Follow-up Information       Follow up With Specialties Details Why Contact Info    The nearest emergency department.  Go to  As needed, If symptoms worsen     Your PCP  Call today to schedule an appointment, for re-evaluation of today's complaint to request referral to Gastroenterology              Raquel Carrillo MD  24 [phone]

## 2024-08-08 ENCOUNTER — TELEPHONE (OUTPATIENT)
Dept: CARDIOLOGY | Facility: CLINIC | Age: 19
End: 2024-08-08
Payer: MEDICAID

## 2024-08-08 ENCOUNTER — OFFICE VISIT (OUTPATIENT)
Dept: URGENT CARE | Facility: CLINIC | Age: 19
End: 2024-08-08
Payer: MEDICAID

## 2024-08-08 VITALS
RESPIRATION RATE: 16 BRPM | WEIGHT: 152 LBS | HEART RATE: 50 BPM | SYSTOLIC BLOOD PRESSURE: 107 MMHG | HEIGHT: 63 IN | BODY MASS INDEX: 26.93 KG/M2 | DIASTOLIC BLOOD PRESSURE: 65 MMHG | TEMPERATURE: 98 F | OXYGEN SATURATION: 98 %

## 2024-08-08 DIAGNOSIS — H61.23 BILATERAL IMPACTED CERUMEN: Primary | ICD-10-CM

## 2024-09-01 ENCOUNTER — HOSPITAL ENCOUNTER (EMERGENCY)
Facility: HOSPITAL | Age: 19
Discharge: HOME | End: 2024-09-01
Attending: EMERGENCY MEDICINE
Payer: MEDICAID

## 2024-09-01 VITALS
TEMPERATURE: 98 F | HEART RATE: 72 BPM | BODY MASS INDEX: 27.43 KG/M2 | HEIGHT: 63 IN | OXYGEN SATURATION: 98 % | WEIGHT: 154.81 LBS | SYSTOLIC BLOOD PRESSURE: 118 MMHG | RESPIRATION RATE: 18 BRPM | DIASTOLIC BLOOD PRESSURE: 68 MMHG

## 2024-09-01 DIAGNOSIS — H61.21 IMPACTED CERUMEN OF RIGHT EAR: Primary | ICD-10-CM

## 2024-09-01 LAB
B-HCG UR QL: NEGATIVE
CTP QC/QA: YES

## 2024-09-01 PROCEDURE — 81025 URINE PREGNANCY TEST: CPT | Mod: ER

## 2024-09-01 PROCEDURE — 99284 EMERGENCY DEPT VISIT MOD MDM: CPT | Mod: 25,ER

## 2024-09-01 PROCEDURE — 69209 REMOVE IMPACTED EAR WAX UNI: CPT | Mod: RT,ER

## 2024-09-01 PROCEDURE — 81025 URINE PREGNANCY TEST: CPT | Mod: ER | Performed by: EMERGENCY MEDICINE

## 2024-09-01 PROCEDURE — 25000003 PHARM REV CODE 250: Mod: ER | Performed by: EMERGENCY MEDICINE

## 2024-09-01 RX ORDER — KETOROLAC TROMETHAMINE 10 MG/1
10 TABLET, FILM COATED ORAL EVERY 6 HOURS PRN
Qty: 12 TABLET | Refills: 0 | Status: SHIPPED | OUTPATIENT
Start: 2024-09-01 | End: 2024-09-04

## 2024-09-01 RX ORDER — NEOMYCIN SULFATE, POLYMYXIN B SULFATE AND HYDROCORTISONE 10; 3.5; 1 MG/ML; MG/ML; [USP'U]/ML
4 SUSPENSION/ DROPS AURICULAR (OTIC) 3 TIMES DAILY
Qty: 10 ML | Refills: 0 | Status: SHIPPED | OUTPATIENT
Start: 2024-09-01 | End: 2024-09-08

## 2024-09-01 RX ORDER — DOCUSATE SODIUM 50 MG/5ML
100 LIQUID ORAL
Status: COMPLETED | OUTPATIENT
Start: 2024-09-01 | End: 2024-09-01

## 2024-09-01 RX ADMIN — DOCUSATE SODIUM 100 MG: 50 LIQUID ORAL at 08:09

## 2024-09-02 NOTE — ED PROVIDER NOTES
Encounter Date: 9/1/2024       History     Chief Complaint   Patient presents with    Otalgia     PT C/O RIGHT EAR PAIN SINCE YESTERDAY     Seen at urgent care on 8/8/24. Ear wax removal attempted in both ears. Unsuccessful right ear wax removal at that time. Referred to ENT awaiting follow up appointment.     The history is provided by the patient.   Otalgia  This is a recurrent problem. The current episode started several weeks ago (nearly two months ago). There is pain in the right ear. The problem occurs intermittently. The problem has been gradually worsening. Pertinent negatives include no ear discharge, no hearing loss, no rhinorrhea, no sore throat, no vomiting and no cough. Her past medical history does not include chronic ear infection or hearing loss.     Review of patient's allergies indicates:  No Known Allergies  Past Medical History:   Diagnosis Date    Anemia      Past Surgical History:   Procedure Laterality Date    TONSILLECTOMY       Family History   Problem Relation Name Age of Onset    Hypertension Mother      Hypertension Paternal Grandmother      Diabetes Maternal Grandmother      Hypertension Maternal Grandmother      Hypertension Maternal Grandfather       Social History     Tobacco Use    Smoking status: Former     Types: Cigarettes    Smokeless tobacco: Never   Substance Use Topics    Alcohol use: No    Drug use: Yes     Types: Marijuana     Review of Systems   HENT:  Positive for ear pain. Negative for ear discharge, hearing loss, rhinorrhea and sore throat.    Respiratory:  Negative for cough.    Gastrointestinal:  Negative for vomiting.       Physical Exam     Initial Vitals [09/01/24 1909]   BP Pulse Resp Temp SpO2   110/72 64 19 98.2 °F (36.8 °C) 98 %      MAP       --         Physical Exam    Nursing note and vitals reviewed.  Constitutional: She appears well-developed and well-nourished. She is not diaphoretic. No distress.   HENT:   Head: Normocephalic and atraumatic.   Right Ear:  No drainage, swelling or tenderness.   Left Ear: No drainage, swelling or tenderness.  No middle ear effusion.   Mouth/Throat: Oropharynx is clear and moist.   Right ear canal with impacted cerumen obscuring TM.  Left ear with partial ear wax occluding canal. Visualized portion of TM is intact and normal.    Eyes: Conjunctivae are normal.   Neck: Neck supple.   Normal range of motion.  Cardiovascular:  Normal rate and intact distal pulses.           Pulmonary/Chest: No accessory muscle usage. No tachypnea. No respiratory distress.   Abdominal: She exhibits no distension. There is no abdominal tenderness.   Musculoskeletal:         General: No tenderness. Normal range of motion.      Cervical back: Normal range of motion and neck supple.     Neurological: She is alert and oriented to person, place, and time. She has normal strength. Gait normal. GCS eye subscore is 4. GCS verbal subscore is 5. GCS motor subscore is 6.   Skin: Skin is warm. Capillary refill takes less than 2 seconds.   Psychiatric: She has a normal mood and affect.         ED Course   Ear Wax Removal    Date/Time: 9/1/2024 8:20 PM    Performed by: Raquel Carrillo MD  Authorized by: Raquel Carrillo MD    Anesthesia:  Local Anesthetic: none  Ceruminolytics applied prior to the procedure.  Location details: right ear  Procedure type: irrigation Cerumen Removal Results: Cerumen partially removed.  Patient tolerance: Patient tolerated the procedure well with no immediate complications  Comments: External canal with edema and erythema. Partially visualized TM intact, scarred, nonerythematous        Labs Reviewed   POCT URINE PREGNANCY       Result Value    POC Preg Test, Ur Negative       Acceptable Yes            Imaging Results    None          Medications   docusate 50 mg/5 mL liquid 100 mg (100 mg Otic Given 9/1/24 2026)     Medical Decision Making  Amount and/or Complexity of Data Reviewed  Labs: ordered.    Risk  OTC  drugs.  Prescription drug management.                          Medical Decision Making:   Differential Diagnosis:   Otitis media, otitis externa, otic foreign body, tympanic membrane perforation, cerumen impaction, bullous myringitis, [place] Padilla syndrome (herpes zoster oticus) , mastoiditis, malignant otitis externa, others       ED Management:  Exam consistent with impacted cerumen of the right ear canal with acute otitis otitis externa of the right ear canal after impacted cerumen partially removed.  Outpatient management plan, outpatient PCP/ENT follow-up and ED return precautions discussed with patient with understanding and agreement.             Clinical Impression:  Final diagnoses:  [H61.21] Impacted cerumen of right ear (Primary)          ED Disposition Condition    Discharge Stable          ED Prescriptions       Medication Sig Dispense Start Date End Date Auth. Provider    ketorolac (TORADOL) 10 mg tablet () Take 1 tablet (10 mg total) by mouth every 6 (six) hours as needed for Pain (take with food). 12 tablet 2024 Raquel Carrillo MD    neomycin-polymyxin-hydrocortisone (CORTISPORIN) 3.5-10,000-1 mg/mL-unit/mL-% otic suspension Place 4 drops into the right ear 3 (three) times daily. for 7 days 10 mL 2024 Raquel Carrillo MD    carbamide peroxide (DEBROX) 6.5 % otic solution Place 5 drops into the left ear 2 (two) times daily as needed (ear wax build up). 15 mL 2024 Raquel Carrillo MD          Follow-up Information       Follow up With Specialties Details Why Contact Info Additional Information    The nearest emergency department.  Go to  As needed, If symptoms worsen      Your PCP  Call  As needed, for ongoing care      [facility] Otolaryngology Call in 2 days to schedule an appointment, for re-evaluation of today's complaint for ear wax removal [address]  [facility] [address] [phone] Please park in garage or surface  lot and use Medical Office Bldg entrance. Check in at [address].              Raquel Carrillo MD  09/06/24 0048

## 2024-09-02 NOTE — ED NOTES
After applying Docusate to R ear, irrigated ear canal with 100cc sterile water; applied peroxide and irrigated with another 60cc sterile water. Large wax plug removed from R ear canal

## 2024-09-04 ENCOUNTER — OFFICE VISIT (OUTPATIENT)
Dept: CARDIOLOGY | Facility: CLINIC | Age: 19
End: 2024-09-04
Payer: MEDICAID

## 2024-09-04 VITALS
HEART RATE: 85 BPM | DIASTOLIC BLOOD PRESSURE: 64 MMHG | RESPIRATION RATE: 15 BRPM | HEIGHT: 63 IN | OXYGEN SATURATION: 98 % | WEIGHT: 155.56 LBS | SYSTOLIC BLOOD PRESSURE: 98 MMHG | BODY MASS INDEX: 27.56 KG/M2

## 2024-09-04 DIAGNOSIS — R00.1 BRADYCARDIA: ICD-10-CM

## 2024-09-04 DIAGNOSIS — R07.9 CHEST PAIN, UNSPECIFIED TYPE: Primary | ICD-10-CM

## 2024-09-04 DIAGNOSIS — R07.89 CHEST PAIN, ATYPICAL: ICD-10-CM

## 2024-09-04 DIAGNOSIS — R06.09 DOE (DYSPNEA ON EXERTION): ICD-10-CM

## 2024-09-04 PROCEDURE — 99999 PR PBB SHADOW E&M-EST. PATIENT-LVL III: CPT | Mod: PBBFAC,,, | Performed by: INTERNAL MEDICINE

## 2024-09-04 PROCEDURE — 93005 ELECTROCARDIOGRAM TRACING: CPT | Mod: PBBFAC | Performed by: INTERNAL MEDICINE

## 2024-09-04 PROCEDURE — 99213 OFFICE O/P EST LOW 20 MIN: CPT | Mod: PBBFAC | Performed by: INTERNAL MEDICINE

## 2024-09-04 PROCEDURE — 3078F DIAST BP <80 MM HG: CPT | Mod: CPTII,,, | Performed by: INTERNAL MEDICINE

## 2024-09-04 PROCEDURE — 3008F BODY MASS INDEX DOCD: CPT | Mod: CPTII,,, | Performed by: INTERNAL MEDICINE

## 2024-09-04 PROCEDURE — 99204 OFFICE O/P NEW MOD 45 MIN: CPT | Mod: S$PBB,,, | Performed by: INTERNAL MEDICINE

## 2024-09-04 PROCEDURE — 1159F MED LIST DOCD IN RCRD: CPT | Mod: CPTII,,, | Performed by: INTERNAL MEDICINE

## 2024-09-04 PROCEDURE — 3074F SYST BP LT 130 MM HG: CPT | Mod: CPTII,,, | Performed by: INTERNAL MEDICINE

## 2024-09-04 NOTE — PROGRESS NOTES
Subjective   Patient ID:  Sharri Armstrong is a 19 y.o. female who presents for evaluation of Chest Pain, Bradycardia, and Consult      HPI    Went to the ER 3/27/24  19 y.o. female emergency department complaining of acute, epigastric abdominal pain nausea and urge to vomit but unable to do so that occurred around 11:00 p.m. tonight.  She reports multiple similar recurrences over last couple of months.  Diagnosis unknown.  She reports pain is usually alleviated by taking Zofran but indicates that she did not attempt any treatment prior to arrival.   She reports resolution of today's symptoms within 10 minutes of onset.  She mentions having chronic constipation for which she takes MiraLax daily but states she missed taking the MiraLax over the last couple of days.  She mentions sneezing and runny nose over the last two days but denies fever, sore throat, cough, congestion, fever or malaise.  She further denies dysuria, frequency, hematuria, vaginal discharge, diarrhea, bright red blood per rectum, melena, shortness of breath or chest pain.     TSH 1.7 (3/26/23)    EKG 9/21/23 sinus bradycardia 50 otherwise ok    9/4/24 Was told during a recent sleep study that she had an abnormal HR  Occasional crampy CP with mild BARRIOS, denies dizziness or near syncope  EKG NSR low voltage otherwise ok  Not a smoker    Review of Systems   Constitutional: Negative for decreased appetite.   HENT:  Negative for ear discharge.    Eyes:  Negative for blurred vision.   Respiratory:  Negative for hemoptysis.    Endocrine: Negative for polyphagia.   Hematologic/Lymphatic: Negative for adenopathy.   Skin:  Negative for color change.   Musculoskeletal:  Negative for joint swelling.   Genitourinary:  Negative for bladder incontinence.   Neurological:  Negative for brief paralysis.   Psychiatric/Behavioral:  Negative for hallucinations.    Allergic/Immunologic: Negative for hives.          Objective     Physical Exam  Constitutional:        Appearance: She is well-developed.   HENT:      Head: Normocephalic and atraumatic.   Eyes:      Conjunctiva/sclera: Conjunctivae normal.      Pupils: Pupils are equal, round, and reactive to light.   Cardiovascular:      Rate and Rhythm: Normal rate.      Pulses: Intact distal pulses.      Heart sounds: Normal heart sounds.   Pulmonary:      Effort: Pulmonary effort is normal.      Breath sounds: Normal breath sounds.   Abdominal:      General: Bowel sounds are normal.      Palpations: Abdomen is soft.   Musculoskeletal:         General: Normal range of motion.      Cervical back: Normal range of motion and neck supple.   Skin:     General: Skin is warm and dry.   Neurological:      Mental Status: She is alert and oriented to person, place, and time.            Assessment and Plan     1. Chest pain, unspecified type    2. Chest pain, atypical    3. Bradycardia    4. BARRIOS (dyspnea on exertion)        Plan:     Echo and treadmill stress test for CP and BARRIOS  Holter for bradycardia    Advance Care Planning     Date: 09/04/2024  Patient did not wish or was not able to name a surrogate decision maker or provide an Advance Care Plan.

## 2024-09-07 LAB
OHS QRS DURATION: 76 MS
OHS QTC CALCULATION: 405 MS

## 2024-10-23 ENCOUNTER — HOSPITAL ENCOUNTER (OUTPATIENT)
Dept: CARDIOLOGY | Facility: HOSPITAL | Age: 19
Discharge: HOME | End: 2024-10-23
Attending: INTERNAL MEDICINE
Payer: MEDICAID

## 2024-10-23 DIAGNOSIS — R07.9 CHEST PAIN, UNSPECIFIED TYPE: ICD-10-CM

## 2024-10-23 DIAGNOSIS — R06.09 DOE (DYSPNEA ON EXERTION): ICD-10-CM

## 2024-10-23 DIAGNOSIS — R07.89 CHEST PAIN, ATYPICAL: ICD-10-CM

## 2024-10-23 DIAGNOSIS — R00.1 BRADYCARDIA: ICD-10-CM

## 2024-10-23 LAB
AORTIC ROOT ANNULUS: 2.42 CM
AORTIC VALVE CUSP SEPERATION: 2.1 CM
ASCENDING AORTA: 2.5 CM
AV INDEX (PROSTH): 0.66
AV MEAN GRADIENT: 4.7 MMHG
AV PEAK GRADIENT: 9 MMHG
AV VALVE AREA BY VELOCITY RATIO: 2.1 CM²
AV VALVE AREA: 2.1 CM²
AV VELOCITY RATIO: 0.67
CV ECHO LV RWT: 0.52 CM
CV STRESS BASE HR: 59 BPM
DIASTOLIC BLOOD PRESSURE: 67 MMHG
DOP CALC AO PEAK VEL: 1.5 M/S
DOP CALC AO VTI: 36.9 CM
DOP CALC LVOT AREA: 3.1 CM2
DOP CALC LVOT DIAMETER: 2 CM
DOP CALC LVOT PEAK VEL: 1 M/S
DOP CALC LVOT STROKE VOLUME: 76.6 CM3
DOP CALCLVOT PEAK VEL VTI: 24.4 CM
E WAVE DECELERATION TIME: 270 MSEC
E/A RATIO: 2.18
E/E' RATIO: 6.23 M/S
ECHO LV POSTERIOR WALL: 1.1 CM (ref 0.6–1.1)
FRACTIONAL SHORTENING: 35.7 % (ref 28–44)
INTERVENTRICULAR SEPTUM: 0.9 CM (ref 0.6–1.1)
IVC DIAMETER: 1.42 CM
IVRT: 128.45 MSEC
LA MAJOR: 4.58 CM
LA MINOR: 3.68 CM
LA WIDTH: 3.5 CM
LEFT ATRIUM SIZE: 3.07 CM
LEFT ATRIUM VOLUME: 37.27 CM3
LEFT INTERNAL DIMENSION IN SYSTOLE: 2.7 CM (ref 2.1–4)
LEFT VENTRICLE DIASTOLIC VOLUME: 76.61 ML
LEFT VENTRICLE SYSTOLIC VOLUME: 27.95 ML
LEFT VENTRICULAR INTERNAL DIMENSION IN DIASTOLE: 4.2 CM (ref 3.5–6)
LEFT VENTRICULAR MASS: 137.2 G
LV LATERAL E/E' RATIO: 5.74 M/S
LV SEPTAL E/E' RATIO: 6.81 M/S
LVED V (TEICH): 76.61 ML
LVES V (TEICH): 27.95 ML
LVOT MG: 1.91 MMHG
LVOT MV: 0.65 CM/S
MV PEAK A VEL: 0.5 M/S
MV PEAK E VEL: 1.09 M/S
MV STENOSIS PRESSURE HALF TIME: 78.3 MS
MV VALVE AREA P 1/2 METHOD: 2.81 CM2
OHS CV CPX 1 MINUTE RECOVERY HEART RATE: 151 BPM
OHS CV CPX 85 PERCENT MAX PREDICTED HEART RATE MALE: 169
OHS CV CPX ESTIMATED METS: 10
OHS CV CPX MAX PREDICTED HEART RATE: 199
OHS CV CPX PATIENT IS FEMALE: 1
OHS CV CPX PATIENT IS MALE: 0
OHS CV CPX PEAK DIASTOLIC BLOOD PRESSURE: 65 MMHG
OHS CV CPX PEAK HEAR RATE: 166 BPM
OHS CV CPX PEAK RATE PRESSURE PRODUCT: NORMAL
OHS CV CPX PEAK SYSTOLIC BLOOD PRESSURE: 135 MMHG
OHS CV CPX PERCENT MAX PREDICTED HEART RATE ACHIEVED: 89
OHS CV CPX RATE PRESSURE PRODUCT PRESENTING: 7139
PISA TR MAX VEL: 2.4 M/S
PULM VEIN S/D RATIO: 1.18
PV PEAK D VEL: 0.71 M/S
PV PEAK GRADIENT: 5 MMHG
PV PEAK S VEL: 0.84 M/S
PV PEAK VELOCITY: 1.14 M/S
RA MAJOR: 4.26 CM
RA PRESSURE ESTIMATED: 8 MMHG
RA WIDTH: 3.5 CM
RIGHT VENTRICULAR END-DIASTOLIC DIMENSION: 2.92 CM
RV TB RVSP: 10 MMHG
RV TISSUE DOPPLER FREE WALL SYSTOLIC VELOCITY 1 (APICAL 4 CHAMBER VIEW): 11.71 CM/S
SINUS: 2.95 CM
STJ: 2.49 CM
STRESS ECHO POST EXERCISE DUR MIN: 9 MINUTES
STRESS ECHO POST EXERCISE DUR SEC: 0 SECONDS
STRESS ST DEPRESSION: 1 MM
SYSTOLIC BLOOD PRESSURE: 121 MMHG
TDI LATERAL: 0.19 M/S
TDI SEPTAL: 0.16 M/S
TDI: 0.18 M/S
TR MAX PG: 23 MMHG
TRICUSPID ANNULAR PLANE SYSTOLIC EXCURSION: 1.73 CM
TV REST PULMONARY ARTERY PRESSURE: 31 MMHG

## 2024-10-23 PROCEDURE — 93306 TTE W/DOPPLER COMPLETE: CPT

## 2024-10-23 PROCEDURE — 93018 CV STRESS TEST I&R ONLY: CPT | Mod: ,,, | Performed by: INTERNAL MEDICINE

## 2024-10-23 PROCEDURE — 93227 XTRNL ECG REC<48 HR R&I: CPT | Mod: ,,, | Performed by: INTERNAL MEDICINE

## 2024-10-23 PROCEDURE — 93016 CV STRESS TEST SUPVJ ONLY: CPT | Mod: ,,, | Performed by: INTERNAL MEDICINE

## 2024-10-23 PROCEDURE — 93017 CV STRESS TEST TRACING ONLY: CPT

## 2024-10-23 PROCEDURE — 93306 TTE W/DOPPLER COMPLETE: CPT | Mod: 26,,, | Performed by: INTERNAL MEDICINE

## 2024-10-23 PROCEDURE — 93226 XTRNL ECG REC<48 HR SCAN A/R: CPT

## 2024-10-23 PROCEDURE — 93225 XTRNL ECG REC<48 HRS REC: CPT

## 2024-10-26 LAB
OHS CV EVENT MONITOR DAY: 1
OHS CV HOLTER LENGTH DECIMAL HOURS: 48
OHS CV HOLTER LENGTH HOURS: 24
OHS CV HOLTER LENGTH MINUTES: 0
OHS CV HOLTER SINUS AVERAGE HR: 61
OHS CV HOLTER SINUS MAX HR: 117
OHS CV HOLTER SINUS MIN HR: 34

## 2024-11-05 ENCOUNTER — OFFICE VISIT (OUTPATIENT)
Dept: CARDIOLOGY | Facility: CLINIC | Age: 19
End: 2024-11-05
Payer: MEDICAID

## 2024-11-05 VITALS
WEIGHT: 155.44 LBS | BODY MASS INDEX: 27.54 KG/M2 | HEART RATE: 78 BPM | SYSTOLIC BLOOD PRESSURE: 110 MMHG | HEIGHT: 63 IN | DIASTOLIC BLOOD PRESSURE: 70 MMHG

## 2024-11-05 DIAGNOSIS — R06.09 DOE (DYSPNEA ON EXERTION): ICD-10-CM

## 2024-11-05 DIAGNOSIS — R07.89 CHEST PAIN, ATYPICAL: ICD-10-CM

## 2024-11-05 DIAGNOSIS — R00.1 BRADYCARDIA: Primary | ICD-10-CM

## 2024-11-05 PROCEDURE — 3078F DIAST BP <80 MM HG: CPT | Mod: CPTII,,, | Performed by: INTERNAL MEDICINE

## 2024-11-05 PROCEDURE — 3008F BODY MASS INDEX DOCD: CPT | Mod: CPTII,,, | Performed by: INTERNAL MEDICINE

## 2024-11-05 PROCEDURE — 99214 OFFICE O/P EST MOD 30 MIN: CPT | Mod: S$PBB,,, | Performed by: INTERNAL MEDICINE

## 2024-11-05 PROCEDURE — 1159F MED LIST DOCD IN RCRD: CPT | Mod: CPTII,,, | Performed by: INTERNAL MEDICINE

## 2024-11-05 PROCEDURE — 3074F SYST BP LT 130 MM HG: CPT | Mod: CPTII,,, | Performed by: INTERNAL MEDICINE

## 2024-11-05 PROCEDURE — 99213 OFFICE O/P EST LOW 20 MIN: CPT | Mod: PBBFAC | Performed by: INTERNAL MEDICINE

## 2024-11-05 PROCEDURE — 99999 PR PBB SHADOW E&M-EST. PATIENT-LVL III: CPT | Mod: PBBFAC,,, | Performed by: INTERNAL MEDICINE

## 2024-11-05 NOTE — PROGRESS NOTES
Subjective   Patient ID:  Sharri Armstrong is a 19 y.o. female who presents for follow-up of Results      HPI      Went to the ER 3/27/24  19 y.o. female emergency department complaining of acute, epigastric abdominal pain nausea and urge to vomit but unable to do so that occurred around 11:00 p.m. tonight.  She reports multiple similar recurrences over last couple of months.  Diagnosis unknown.  She reports pain is usually alleviated by taking Zofran but indicates that she did not attempt any treatment prior to arrival.   She reports resolution of today's symptoms within 10 minutes of onset.  She mentions having chronic constipation for which she takes MiraLax daily but states she missed taking the MiraLax over the last couple of days.  She mentions sneezing and runny nose over the last two days but denies fever, sore throat, cough, congestion, fever or malaise.  She further denies dysuria, frequency, hematuria, vaginal discharge, diarrhea, bright red blood per rectum, melena, shortness of breath or chest pain.      TSH 1.7 (3/26/23)     EKG 9/21/23 sinus bradycardia 50 otherwise ok    Stress test 10/23/24    The patient exercised for 9 minutes 0 seconds on a Lucius protocol, corresponding to a functional capacity of 10METS, achieving a peak heart rate of 166 bpm, which is 89% of the age predicted maximum heart rate. The patient experienced exercise-limiting angina during the test. The patient reported chest discomfort during the stress test.    The ECG portion of the study is positive for ischemia.    The patient reported chest pain during the stress test.    The blood pressure response to stress was normal.    DTS= +1 (intermed risk).       Echo 10/23/24    Left Ventricle: The left ventricle is normal in size. Increased wall thickness. There is mild concentric hypertrophy. There is normal systolic function with a visually estimated ejection fraction of 55 - 60%. There is normal diastolic function.    Right  Ventricle: Normal right ventricular cavity size. Systolic function is normal.    Pulmonary Artery: The estimated pulmonary artery systolic pressure is 31 mmHg.     Holter 10/23/24    Predominant Rhythm Sinus rhythm with heart rates varying between 34 and 117 BPM with an average of 61 BPM.    There were PVCs totalling 0    There were occasional PACs totalling 1388 and averaging 28.92 per hour.     9/4/24 Was told during a recent sleep study that she had an abnormal HR  Occasional crampy CP with mild BARRIOS, denies dizziness or near syncope  EKG NSR low voltage otherwise ok  Not a smoker  Echo and treadmill stress test for CP and BARRIOS  Holter for bradycardia    11/5/24 Denies CP or SOB    Review of Systems   Constitutional: Negative for decreased appetite.   HENT:  Negative for ear discharge.    Eyes:  Negative for blurred vision.   Respiratory:  Negative for hemoptysis.    Endocrine: Negative for polyphagia.   Hematologic/Lymphatic: Negative for adenopathy.   Skin:  Negative for color change.   Musculoskeletal:  Negative for joint swelling.   Genitourinary:  Negative for bladder incontinence.   Neurological:  Negative for brief paralysis.   Psychiatric/Behavioral:  Negative for hallucinations.    Allergic/Immunologic: Negative for hives.          Objective     Physical Exam  Constitutional:       Appearance: She is well-developed.   HENT:      Head: Normocephalic and atraumatic.   Eyes:      Conjunctiva/sclera: Conjunctivae normal.      Pupils: Pupils are equal, round, and reactive to light.   Cardiovascular:      Rate and Rhythm: Normal rate.      Pulses: Intact distal pulses.      Heart sounds: Normal heart sounds.   Pulmonary:      Effort: Pulmonary effort is normal.      Breath sounds: Normal breath sounds.   Abdominal:      General: Bowel sounds are normal.      Palpations: Abdomen is soft.   Musculoskeletal:         General: Normal range of motion.      Cervical back: Normal range of motion and neck supple.    Skin:     General: Skin is warm and dry.   Neurological:      Mental Status: She is alert and oriented to person, place, and time.            Assessment and Plan     1. Bradycardia    2. BARRIOS (dyspnea on exertion)    3. Chest pain, atypical        Plan:     Echo and holter ok  Stress test positive - suspect this is a false positive - recommend stress echo to confirm    Advance Care Planning     Date: 11/05/2024  Patient did not wish or was not able to name a surrogate decision maker or provide an Advance Care Plan.

## 2025-01-14 ENCOUNTER — HOSPITAL ENCOUNTER (OUTPATIENT)
Dept: CARDIOLOGY | Facility: HOSPITAL | Age: 20
Discharge: HOME | End: 2025-01-14
Attending: INTERNAL MEDICINE
Payer: MEDICAID

## 2025-01-14 DIAGNOSIS — R06.09 DOE (DYSPNEA ON EXERTION): ICD-10-CM

## 2025-01-14 DIAGNOSIS — R00.1 BRADYCARDIA: ICD-10-CM

## 2025-01-14 DIAGNOSIS — R07.89 CHEST PAIN, ATYPICAL: ICD-10-CM

## 2025-01-14 LAB
CV STRESS BASE HR: 85 BPM
DIASTOLIC BLOOD PRESSURE: 63 MMHG
OHS CV CPX 1 MINUTE RECOVERY HEART RATE: 142 BPM
OHS CV CPX 85 PERCENT MAX PREDICTED HEART RATE MALE: 168
OHS CV CPX ESTIMATED METS: 11
OHS CV CPX MAX PREDICTED HEART RATE: 198
OHS CV CPX PATIENT IS FEMALE: 1
OHS CV CPX PATIENT IS MALE: 0
OHS CV CPX PEAK DIASTOLIC BLOOD PRESSURE: 58 MMHG
OHS CV CPX PEAK HEAR RATE: 171 BPM
OHS CV CPX PEAK RATE PRESSURE PRODUCT: NORMAL
OHS CV CPX PEAK SYSTOLIC BLOOD PRESSURE: 143 MMHG
OHS CV CPX PERCENT MAX PREDICTED HEART RATE ACHIEVED: 92
OHS CV CPX RATE PRESSURE PRODUCT PRESENTING: NORMAL
STRESS ECHO POST EXERCISE DUR MIN: 9 MINUTES
STRESS ECHO POST EXERCISE DUR SEC: 42 SECONDS
STRESS ST DEPRESSION: 1 MM
SYSTOLIC BLOOD PRESSURE: 123 MMHG

## 2025-01-14 PROCEDURE — 93325 DOPPLER ECHO COLOR FLOW MAPG: CPT | Mod: 26,,, | Performed by: INTERNAL MEDICINE

## 2025-01-14 PROCEDURE — 93351 STRESS TTE COMPLETE: CPT | Mod: 26,,, | Performed by: INTERNAL MEDICINE

## 2025-01-14 PROCEDURE — 93320 DOPPLER ECHO COMPLETE: CPT | Mod: 26,,, | Performed by: INTERNAL MEDICINE

## 2025-01-14 PROCEDURE — 93320 DOPPLER ECHO COMPLETE: CPT

## 2025-02-11 ENCOUNTER — OFFICE VISIT (OUTPATIENT)
Dept: CARDIOLOGY | Facility: CLINIC | Age: 20
End: 2025-02-11
Payer: MEDICAID

## 2025-02-11 DIAGNOSIS — R00.1 BRADYCARDIA: ICD-10-CM

## 2025-02-11 DIAGNOSIS — R07.89 CHEST PAIN, ATYPICAL: ICD-10-CM

## 2025-02-11 DIAGNOSIS — R06.09 DOE (DYSPNEA ON EXERTION): Primary | ICD-10-CM

## 2025-02-11 NOTE — PROGRESS NOTES
Subjective   Patient ID:  Sharri Armstrong is a 20 y.o. female who presents for follow-up of No chief complaint on file.      HPI      Went to the ER 3/27/24  19 y.o. female emergency department complaining of acute, epigastric abdominal pain nausea and urge to vomit but unable to do so that occurred around 11:00 p.m. tonight.  She reports multiple similar recurrences over last couple of months.  Diagnosis unknown.  She reports pain is usually alleviated by taking Zofran but indicates that she did not attempt any treatment prior to arrival.   She reports resolution of today's symptoms within 10 minutes of onset.  She mentions having chronic constipation for which she takes MiraLax daily but states she missed taking the MiraLax over the last couple of days.  She mentions sneezing and runny nose over the last two days but denies fever, sore throat, cough, congestion, fever or malaise.  She further denies dysuria, frequency, hematuria, vaginal discharge, diarrhea, bright red blood per rectum, melena, shortness of breath or chest pain.      TSH 1.7 (3/26/23)     EKG 9/21/23 sinus bradycardia 50 otherwise ok    Stress echo 1/14/25    Left Ventricle: There is normal systolic function with a visually estimated ejection fraction of 55 - 60%.    Right Ventricle: Normal right ventricular cavity size. Systolic function is normal.    Stress Protocol: The patient exercised for 9 minutes 42 seconds on a Lucius protocol, corresponding to a functional capacity of 11 estimated METS, achieving a peak heart rate of 171 bpm, which is 92% of the age predicted maximum heart rate. The patient experienced no angina during the test. Their exercise capacity was normal. The patient reported chest discomfort during the stress test. The test was stopped because the patient experienced fatigue.    Baseline ECG: The Baseline ECG reveals sinus rhythm. The baseline ECG has non-specific ST-T wave changes.    Stress ECG: There is 1.0 mm of  downward-sloping ST segment depression noted during stress. There are no arrhythmias during stress. There is normal blood pressure response with stress.    ECG Conclusion: The ECG portion of the study is positive for ischemia.    Post-stress Conclusion: The study is negative with no echocardiographic evidence of stress induced ischemia.     Stress test 10/23/24    The patient exercised for 9 minutes 0 seconds on a Lucius protocol, corresponding to a functional capacity of 10METS, achieving a peak heart rate of 166 bpm, which is 89% of the age predicted maximum heart rate. The patient experienced exercise-limiting angina during the test. The patient reported chest discomfort during the stress test.    The ECG portion of the study is positive for ischemia.    The patient reported chest pain during the stress test.    The blood pressure response to stress was normal.    DTS= +1 (intermed risk).        Echo 10/23/24    Left Ventricle: The left ventricle is normal in size. Increased wall thickness. There is mild concentric hypertrophy. There is normal systolic function with a visually estimated ejection fraction of 55 - 60%. There is normal diastolic function.    Right Ventricle: Normal right ventricular cavity size. Systolic function is normal.    Pulmonary Artery: The estimated pulmonary artery systolic pressure is 31 mmHg.     Holter 10/23/24    Predominant Rhythm Sinus rhythm with heart rates varying between 34 and 117 BPM with an average of 61 BPM.    There were PVCs totalling 0    There were occasional PACs totalling 1388 and averaging 28.92 per hour.     9/4/24 Was told during a recent sleep study that she had an abnormal HR  Occasional crampy CP with mild BARRIOS, denies dizziness or near syncope  EKG NSR low voltage otherwise ok  Not a smoker  Echo and treadmill stress test for CP and BARRIOS  Holter for bradycardia     11/5/24 Denies CP or SOB  Echo and holter ok  Stress test positive - suspect this is a false positive  - recommend stress echo to confirm     2/11/25 Denies CP or SOB  BP controlled       Review of Systems   Constitutional: Negative for decreased appetite.   HENT:  Negative for ear discharge.    Eyes:  Negative for blurred vision.   Respiratory:  Negative for hemoptysis.    Endocrine: Negative for polyphagia.   Hematologic/Lymphatic: Negative for adenopathy.   Skin:  Negative for color change.   Musculoskeletal:  Negative for joint swelling.   Genitourinary:  Negative for bladder incontinence.   Neurological:  Negative for brief paralysis.   Psychiatric/Behavioral:  Negative for hallucinations.    Allergic/Immunologic: Negative for hives.          Objective     Physical Exam  Eyes:      Extraocular Movements: Extraocular movements intact.   Pulmonary:      Effort: Pulmonary effort is normal.   Neurological:      Mental Status: She is alert and oriented to person, place, and time.            Assessment and Plan     1. BARRIOS (dyspnea on exertion)    2. Bradycardia    3. Chest pain, atypical        Plan:     Stress echo negative for ischemia - suspect positive EKG is a false positive - symptoms atypical - observe for now  OV 1 year    Advance Care Planning     Date: 02/11/2025  Patient did not wish or was not able to name a surrogate decision maker or provide an Advance Care Plan.

## 2025-03-14 ENCOUNTER — HOSPITAL ENCOUNTER (EMERGENCY)
Facility: HOSPITAL | Age: 20
Discharge: HOME | End: 2025-03-14
Attending: STUDENT IN AN ORGANIZED HEALTH CARE EDUCATION/TRAINING PROGRAM
Payer: MEDICAID

## 2025-03-14 VITALS
TEMPERATURE: 98 F | HEART RATE: 63 BPM | OXYGEN SATURATION: 99 % | HEIGHT: 63 IN | SYSTOLIC BLOOD PRESSURE: 122 MMHG | BODY MASS INDEX: 27.07 KG/M2 | DIASTOLIC BLOOD PRESSURE: 70 MMHG | RESPIRATION RATE: 18 BRPM | WEIGHT: 152.81 LBS

## 2025-03-14 DIAGNOSIS — K59.00 CONSTIPATION, UNSPECIFIED CONSTIPATION TYPE: Primary | ICD-10-CM

## 2025-03-14 DIAGNOSIS — R10.9 ABDOMINAL PAIN: ICD-10-CM

## 2025-03-14 LAB
ALBUMIN SERPL-MCNC: 3.8 G/DL (ref 3.3–5.5)
ALBUMIN SERPL-MCNC: 3.9 G/DL (ref 3.3–5.5)
ALBUMIN SERPL-MCNC: 4.2 G/DL (ref 3.3–5.5)
ALP SERPL-CCNC: 68 U/L (ref 42–141)
ALP SERPL-CCNC: 73 U/L (ref 42–141)
ALP SERPL-CCNC: 83 U/L (ref 42–141)
B-HCG UR QL: NEGATIVE
BILIRUB SERPL-MCNC: 0.6 MG/DL (ref 0.2–1.6)
BILIRUB SERPL-MCNC: 0.7 MG/DL (ref 0.2–1.6)
BILIRUB SERPL-MCNC: 0.8 MG/DL (ref 0.2–1.6)
BILIRUBIN, POC UA: NEGATIVE
BLOOD, POC UA: NEGATIVE
BUN SERPL-MCNC: 10 MG/DL (ref 7–22)
BUN SERPL-MCNC: 12 MG/DL (ref 7–22)
CALCIUM SERPL-MCNC: 8.8 MG/DL (ref 8–10.3)
CALCIUM SERPL-MCNC: 9.6 MG/DL (ref 8–10.3)
CHLORIDE SERPL-SCNC: 105 MMOL/L (ref 98–108)
CHLORIDE SERPL-SCNC: 106 MMOL/L (ref 98–108)
CLARITY, UA: CLEAR
COLOR, UA: YELLOW
CREAT SERPL-MCNC: 0.8 MG/DL (ref 0.6–1.2)
CREAT SERPL-MCNC: 0.9 MG/DL (ref 0.6–1.2)
CTP QC/QA: YES
GLUCOSE SERPL-MCNC: 105 MG/DL (ref 73–118)
GLUCOSE SERPL-MCNC: 121 MG/DL (ref 73–118)
GLUCOSE, POC UA: NEGATIVE
HCT, POC: NORMAL
HGB, POC: NORMAL (ref 14–18)
KETONES, POC UA: NEGATIVE
LEUKOCYTE EST, POC UA: NEGATIVE
MCH, POC: NORMAL
MCHC, POC: NORMAL
MCV, POC: NORMAL
MPV, POC: NORMAL
NITRITE, POC UA: NEGATIVE
PH UR STRIP: 7 [PH] (ref 5–8)
POC ALT (SGPT): 19 U/L (ref 10–47)
POC ALT (SGPT): 25 U/L (ref 10–47)
POC ALT (SGPT): 25 U/L (ref 10–47)
POC AMYLASE: 82 U/L (ref 14–97)
POC AST (SGOT): 25 U/L (ref 11–38)
POC AST (SGOT): 30 U/L (ref 11–38)
POC AST (SGOT): 31 U/L (ref 11–38)
POC GGT: 24 U/L (ref 5–65)
POC PLATELET COUNT: NORMAL
POC TCO2: 27 MMOL/L (ref 18–33)
POC TCO2: 28 MMOL/L (ref 18–33)
POTASSIUM BLD-SCNC: 4.9 MMOL/L (ref 3.6–5.1)
POTASSIUM BLD-SCNC: 5.5 MMOL/L (ref 3.6–5.1)
PROTEIN, POC UA: NEGATIVE
PROTEIN, POC: 6.5 G/DL (ref 6.4–8.1)
PROTEIN, POC: 7.2 G/DL (ref 6.4–8.1)
PROTEIN, POC: 7.5 G/DL (ref 6.4–8.1)
RBC, POC: NORMAL
RDW, POC: NORMAL
SODIUM BLD-SCNC: 132 MMOL/L (ref 128–145)
SODIUM BLD-SCNC: 137 MMOL/L (ref 128–145)
SPECIFIC GRAVITY, POC UA: 1.02 (ref 1–1.03)
UROBILINOGEN, POC UA: 0.2 E.U./DL
WBC, POC: NORMAL

## 2025-03-14 PROCEDURE — 82040 ASSAY OF SERUM ALBUMIN: CPT | Mod: 59,ER

## 2025-03-14 PROCEDURE — 99285 EMERGENCY DEPT VISIT HI MDM: CPT | Mod: 25,ER

## 2025-03-14 PROCEDURE — 25000003 PHARM REV CODE 250: Mod: ER | Performed by: STUDENT IN AN ORGANIZED HEALTH CARE EDUCATION/TRAINING PROGRAM

## 2025-03-14 PROCEDURE — 96361 HYDRATE IV INFUSION ADD-ON: CPT | Mod: ER

## 2025-03-14 PROCEDURE — 81025 URINE PREGNANCY TEST: CPT | Mod: ER | Performed by: STUDENT IN AN ORGANIZED HEALTH CARE EDUCATION/TRAINING PROGRAM

## 2025-03-14 PROCEDURE — 96374 THER/PROPH/DIAG INJ IV PUSH: CPT | Mod: ER

## 2025-03-14 PROCEDURE — 80053 COMPREHEN METABOLIC PANEL: CPT | Mod: ER

## 2025-03-14 PROCEDURE — 82150 ASSAY OF AMYLASE: CPT | Mod: ER

## 2025-03-14 PROCEDURE — 85025 COMPLETE CBC W/AUTO DIFF WBC: CPT | Mod: ER

## 2025-03-14 PROCEDURE — 81025 URINE PREGNANCY TEST: CPT | Mod: ER

## 2025-03-14 PROCEDURE — 63600175 PHARM REV CODE 636 W HCPCS: Mod: ER | Performed by: STUDENT IN AN ORGANIZED HEALTH CARE EDUCATION/TRAINING PROGRAM

## 2025-03-14 RX ORDER — ONDANSETRON HYDROCHLORIDE 2 MG/ML
4 INJECTION, SOLUTION INTRAVENOUS
Status: COMPLETED | OUTPATIENT
Start: 2025-03-14 | End: 2025-03-14

## 2025-03-14 RX ORDER — BISACODYL 10 MG/1
10 SUPPOSITORY RECTAL
Status: COMPLETED | OUTPATIENT
Start: 2025-03-14 | End: 2025-03-14

## 2025-03-14 RX ORDER — MORPHINE SULFATE 4 MG/ML
4 INJECTION, SOLUTION INTRAMUSCULAR; INTRAVENOUS
Status: DISCONTINUED | OUTPATIENT
Start: 2025-03-14 | End: 2025-03-14

## 2025-03-14 RX ORDER — SYRING-NEEDL,DISP,INSUL,0.3 ML 29 G X1/2"
296 SYRINGE, EMPTY DISPOSABLE MISCELLANEOUS
Status: COMPLETED | OUTPATIENT
Start: 2025-03-14 | End: 2025-03-14

## 2025-03-14 RX ADMIN — ONDANSETRON 4 MG: 2 INJECTION INTRAMUSCULAR; INTRAVENOUS at 02:03

## 2025-03-14 RX ADMIN — MAGNESIUM CITRATE 296 ML: 1.75 LIQUID ORAL at 03:03

## 2025-03-14 RX ADMIN — Medication 1 ENEMA: at 04:03

## 2025-03-14 RX ADMIN — SODIUM CHLORIDE, POTASSIUM CHLORIDE, SODIUM LACTATE AND CALCIUM CHLORIDE 1000 ML: 600; 310; 30; 20 INJECTION, SOLUTION INTRAVENOUS at 02:03

## 2025-03-14 RX ADMIN — BISACODYL 10 MG: 10 SUPPOSITORY RECTAL at 02:03

## 2025-03-14 NOTE — ED PROVIDER NOTES
Encounter Date: 3/14/2025       History     Chief Complaint   Patient presents with    Abdominal Pain     Pt reports LLQ abdominal pain with nausea started 1600, h/o constipation.     HPI    20-year-old female with a history constipation on Linzess presents to the ED for evaluation of severe sudden onset left lower quadrant abdominal pain that started around approximately 4:30 p.m..  She notes since that time the pain has been consistent.  Patient notes she took her Linzess and had to have a bowel movement but instead of having a bowel movement held it.  She notes after that she could not have a bowel movement then developed abdominal pain.  She notes she has had similar pain in the past with constipation.  She denies any nausea, vomiting, fevers, chills, blood in her stool, dysuria, urinary frequency, vaginal bleeding, vaginal discharge.    Review of patient's allergies indicates:  No Known Allergies  Past Medical History:   Diagnosis Date    Anemia      Past Surgical History:   Procedure Laterality Date    TONSILLECTOMY       Family History   Problem Relation Name Age of Onset    Hypertension Mother      Hypertension Paternal Grandmother      Diabetes Maternal Grandmother      Hypertension Maternal Grandmother      Hypertension Maternal Grandfather       Social History[1]  Review of Systems   Constitutional:  Negative for fever.   HENT:  Negative for sore throat.    Respiratory:  Negative for shortness of breath.    Cardiovascular:  Negative for chest pain.   Gastrointestinal:  Positive for abdominal pain and constipation. Negative for blood in stool, diarrhea, nausea and vomiting.   Genitourinary:  Negative for dysuria.   Musculoskeletal:  Negative for back pain.   Skin:  Negative for rash.   Neurological:  Negative for weakness.   Hematological:  Does not bruise/bleed easily.       Physical Exam     Initial Vitals [03/14/25 0125]   BP Pulse Resp Temp SpO2   117/78 82 20 97.8 °F (36.6 °C) 100 %      MAP       --          Physical Exam    Constitutional: Vital signs are normal. She appears well-developed and well-nourished.  Non-toxic appearance. She does not have a sickly appearance. She does not appear ill.   Uncomfortable appearing and in significant pain   HENT:   Head: Normocephalic and atraumatic. Mouth/Throat: Mucous membranes are normal.   Eyes: EOM are normal.   Neck: Neck supple.   Cardiovascular:  Normal rate and regular rhythm.           Pulmonary/Chest: She has no wheezes. She has no rhonchi. She has no rales.   Abdominal: Abdomen is soft. Bowel sounds are normal. There is abdominal tenderness (Left lower quadrant). There is no rebound and no guarding.   Musculoskeletal:      Cervical back: Neck supple.     Neurological: She is alert.   Skin: Skin is warm and dry. No rash noted.   Psychiatric: She has a normal mood and affect.         ED Course   Procedures  Labs Reviewed   POCT CMP - Abnormal       Result Value    Albumin, POC 3.8      Alkaline Phosphatase, POC 68      ALT (SGPT), POC 25      AST (SGOT), POC 31      POC BUN 10      Calcium, POC 8.8      POC Chloride 106      POC Creatinine 0.9      POC Glucose 121 (*)     POC Potassium 5.5 (*)     POC Sodium 132      Bilirubin, POC 0.6      POC TCO2 27      Protein, POC 6.5     POCT URINE PREGNANCY    POC Preg Test, Ur Negative       Acceptable Yes     POCT CBC    Hematocrit        Hemoglobin        RBC        WBC        MCV        MCH, POC        MCHC        RDW-CV        Platelet Count, POC        MPV       POCT URINALYSIS W/O SCOPE   POCT CMP   POCT LIVER PANEL   POCT LIVER PANEL    Albumin, POC 4.2      Alkaline Phosphatase, POC 83      ALT (SGPT), POC 19      Amylase, POC 82      AST (SGOT), POC 25      POC GGT 24      Bilirubin, POC 0.8      Protein, POC 7.2     POCT CMP    Albumin, POC 3.9      Alkaline Phosphatase, POC 73      ALT (SGPT), POC 25      AST (SGOT), POC 30      POC BUN 12      Calcium, POC 9.6      POC Chloride 105      POC  Creatinine 0.8      POC Glucose 105      POC Potassium 4.9      POC Sodium 137      Bilirubin, POC 0.7      POC TCO2 28      Protein, POC 7.5     POCT URINALYSIS W/O SCOPE    Glucose, UA Negative      Bilirubin, UA Negative      Ketones, UA Negative      Spec Grav UA 1.020      Blood, UA Negative      PH, UA 7.0      Protein, UA Negative      Urobilinogen, UA 0.2      Nitrite, UA Negative      Leukocytes, UA Negative      Color, UA POC Yellow      Clarity, UA, POC Clear     POCT CMP          Imaging Results              US Pelvis Complete Non OB (Final result)  Result time 03/14/25 05:22:34   Procedure changed from US Pelvis Comp with Transvag NON-OB (xpd     Final result by Trevor Rich MD (03/14/25 05:22:34)                   Impression:      There is no sonographic evidence for acute process on submitted imaging.      Electronically signed by: Trevor Rich  Date:    03/14/2025  Time:    05:22               Narrative:    EXAMINATION:  US PELVIS COMPLETE NON OB    CLINICAL HISTORY:  LLQ pain;    TECHNIQUE:  Transabdominal pelvic ultrasound was performed.    COMPARISON:  None    FINDINGS:  The examination is limited to transabdominal imaging.  The uterus measures approximately 8 cm in length.  The submitted endometrial thickness measurement is approximately 7-8 mm.    The right ovary measures approximately 3.6 x 1.8 x 2.5 cm in size.  Small follicles are noted.  Doppler imaging demonstrates vascular flow to the right ovary.    The left ovary measures approximately 4 x 2.9 x 2.5 cm in size.  Small follicles are noted.  Doppler imaging demonstrates vascular flow to the left ovary.    There is no sonographic evidence for abnormal free fluid of the pelvis and no additional sonographic evidence for pelvic mass or cystic collection.                                       X-Ray Abdomen Flat And Erect (In process)                      Medications   ondansetron injection 4 mg (4 mg Intravenous Given 3/14/25 0205)    bisacodyL suppository 10 mg (10 mg Rectal Given 3/14/25 1639)   lactated ringers bolus 1,000 mL (0 mLs Intravenous Stopped 3/14/25 0429)   magnesium citrate solution 296 mL (296 mLs Oral Given 3/14/25 0939)   sodium phosphates 19-7 gram/118 mL enema 1 enema (1 enema Rectal Given 3/14/25 0428)     Medical Decision Making  Amount and/or Complexity of Data Reviewed  Labs: ordered.  Radiology: ordered.    Risk  OTC drugs.  Prescription drug management.    Vitals unremarkable   Patient is uncomfortable appearing on exam and left lower quadrant abdominal tenderness   Differential includes ectopic pregnancy, ovarian torsion, diverticulitis, acute cystitis, PID, constipation  UPT negative  UA unremarkable  Basic labs reviewed and unremarkable  Pelvic ultrasound shows normal blood flow of the ovaries bilaterally  Abdominal series, flat and erect appears do show a significant stool burden in the descending colon; no evidence of free air  Patient given a Dulcolax suppository with a small bowel movement in some relief  Given 1 L of fluids prior to additional laxatives  Given a fleets enema as well as a bottle of magnesium citrate   On reassessment patient has passed a significant amount of stool and has felt significant relief  Do not feel additional advanced imaging is warranted at this time  Repeat CMP is read as a K of 5.5; she will was 4.9  I suspect these are falsely elevated as our machines has been running high (there was another patient here today who had a potassium on her outside chemistry of 3.7 3 hours prior to her lab draw here that was 3.7; here it was 5.2)  Neither of these patient have risk factors for hyperkalemia   If anything the patient's potassium should be low given that she has had numerous laxatives  Offered send out CMP to [facility] lab to confirm my suspicions however patient notes she is running low in time and prefers to have the lab rechecked at her scheduled primary care visit on 03/24  I advised  her to return if she has any concerning symptoms like palpitations, chest pain, shortness of breath, fatigue, syncope  Advised to return for reoccurrence of significant abdominal pain  Advised to follow up with the gastroenterologist on an outpatient basis  Patient is stable for discharge    I discussed with the patient/family the diagnosis, treatment plan, indications for return to the emergency department, and for expected follow-up. The patient/family verbalized an understanding. The patient/family is asked if there are any questions or concerns. We discuss the case, until all issues are addressed to the patient/familys satisfaction. Patient/family understands and is agreeable to the plan.   Will Hwang    DISCLAIMER: This note was prepared with [employer] voice recognition transcription software. Garbled syntax, mangled pronouns, and other bizarre constructions may be attributed to that software system.                                    Clinical Impression:  Final diagnoses:  [R10.9] Abdominal pain  [K59.00] Constipation, unspecified constipation type (Primary)          ED Disposition Condition    Discharge Stable          ED Prescriptions    None       Follow-up Information    None            Will Hwang MD  03/14/25 0536       Will Hwang MD  03/14/25 0537         [1]   Social History  Tobacco Use    Smoking status: Former     Types: Cigarettes    Smokeless tobacco: Never   Substance Use Topics    Alcohol use: No    Drug use: Yes     Types: Marijuana        Will Hwang MD  03/14/25 0539

## 2025-03-14 NOTE — DISCHARGE INSTRUCTIONS
You can continue home Linzess as directed by gastroenterologist.  I would follow up with the gastroenterologist on an outpatient basis when you can.  Return for reoccurrence severe abdominal pain or other concerning symptoms.  Be sure to follow up with the regular physician as scheduled to recheck your potassium levels.  I suspect our machine is falsely elevated.  In the meantime I would avoid potassium rich foods like bananas.

## 2025-03-24 ENCOUNTER — PATIENT MESSAGE (OUTPATIENT)
Dept: CARDIOLOGY | Facility: CLINIC | Age: 20
End: 2025-03-24
Payer: MEDICAID